# Patient Record
Sex: MALE | Race: WHITE
[De-identification: names, ages, dates, MRNs, and addresses within clinical notes are randomized per-mention and may not be internally consistent; named-entity substitution may affect disease eponyms.]

---

## 2018-09-24 ENCOUNTER — HOSPITAL ENCOUNTER (INPATIENT)
Dept: HOSPITAL 80 - FED | Age: 43
LOS: 2 days | Discharge: STILL ADMITTED OR ADMINISTRATIVE | DRG: 750 | End: 2018-09-26
Attending: PSYCHIATRY & NEUROLOGY | Admitting: PSYCHIATRY & NEUROLOGY
Payer: MEDICAID

## 2018-09-24 DIAGNOSIS — F25.0: Primary | ICD-10-CM

## 2018-09-24 DIAGNOSIS — B19.20: ICD-10-CM

## 2018-09-24 DIAGNOSIS — R22.1: ICD-10-CM

## 2018-09-24 DIAGNOSIS — F12.90: ICD-10-CM

## 2018-09-24 DIAGNOSIS — F17.200: ICD-10-CM

## 2018-09-24 DIAGNOSIS — F15.90: ICD-10-CM

## 2018-09-24 DIAGNOSIS — Z59.0: ICD-10-CM

## 2018-09-24 DIAGNOSIS — I10: ICD-10-CM

## 2018-09-24 LAB — PLATELET # BLD: 309 10^3/UL (ref 150–400)

## 2018-09-24 PROCEDURE — G0480 DRUG TEST DEF 1-7 CLASSES: HCPCS

## 2018-09-24 SDOH — ECONOMIC STABILITY - HOUSING INSECURITY: HOMELESSNESS: Z59.0
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
## 2018-09-25 RX ADMIN — NICOTINE POLACRILEX PRN MG: 2 GUM, CHEWING BUCCAL at 12:56
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
## 2018-09-25 NOTE — ASMTTCLDSP
TLC Discharge Disposition

 

Disposition:                  Answers:  Admit                                 

Disposition Notes:            

Notes:

Admit 3[facility].

Discharge                     

Concerns/Recommendations:     

Notes:

In consultation with [facility] ED physician, Gurdeep Trujillo MD and on-call psychiatrist, Shola Villegas MD, both concurred that pt appears to meet 27-65 criteria requiring psychiatric 

hospitalization as pt appears to be at risk of harm to self/gravely disabled due to a mental 

illness condition. Pt was given the 3N prohibited belongings list while in the ED.

Was patient given the         Answers:  Yes                                   

Inpatient Behavioral                                                          

Health Prohibited                                                             

Belongings List while in                                                      

the ED?                                                                       

For inpatient                 Shola Villegas MD

admission, the following      

psychiatrist agreed to        

accept patient for            

admission to Behavioral       

Health ([facility]):              

Type of Hold:                 Answers:  M1/72-hour Hold                       

Hold initiated by:            Answers:  Other                         Notes:  [facility] Clinician

 

Date Signed:  09/25/2018 09:48 AM

Electronically Signed By:Derick Baker
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
## 2018-09-26 ENCOUNTER — HOSPITAL ENCOUNTER (INPATIENT)
Dept: HOSPITAL 80 - FED | Age: 43
LOS: 2 days | Discharge: PSYCHIATRIC OR SUBSTANCE USE FACILITY | DRG: 651 | End: 2018-09-28
Attending: INTERNAL MEDICINE | Admitting: INTERNAL MEDICINE
Payer: MEDICAID

## 2018-09-26 VITALS — DIASTOLIC BLOOD PRESSURE: 64 MMHG | SYSTOLIC BLOOD PRESSURE: 127 MMHG

## 2018-09-26 DIAGNOSIS — Z59.0: ICD-10-CM

## 2018-09-26 DIAGNOSIS — B19.20: ICD-10-CM

## 2018-09-26 DIAGNOSIS — D36.0: Primary | ICD-10-CM

## 2018-09-26 DIAGNOSIS — F25.0: ICD-10-CM

## 2018-09-26 DIAGNOSIS — Z23: ICD-10-CM

## 2018-09-26 DIAGNOSIS — Z72.0: ICD-10-CM

## 2018-09-26 LAB — PLATELET # BLD: 300 10^3/UL (ref 150–400)

## 2018-09-26 PROCEDURE — G0008 ADMIN INFLUENZA VIRUS VAC: HCPCS

## 2018-09-26 PROCEDURE — G0009 ADMIN PNEUMOCOCCAL VACCINE: HCPCS

## 2018-09-26 RX ADMIN — ACETAMINOPHEN PRN MG: 325 TABLET ORAL at 15:25

## 2018-09-26 RX ADMIN — DIVALPROEX SODIUM SCH MG: 500 TABLET, EXTENDED RELEASE ORAL at 21:17

## 2018-09-26 RX ADMIN — NICOTINE POLACRILEX PRN MG: 2 GUM, CHEWING BUCCAL at 02:19

## 2018-09-26 SDOH — ECONOMIC STABILITY - HOUSING INSECURITY: HOMELESSNESS: Z59.0

## 2018-09-26 NOTE — ASMTBHMTP
Master Treatment Plan

 

Master Treatment Plan         Answers:  Depressed Mood with                   

for:                                    Suicidal Ideation                     

Date:                         09/26/2018

Diagnosis on Admission:       Schizoaffective Disorder, Bipolar Type   295.70 

                               (F25.0)        

Expected length of stay:      3-5 days

Reason for admission:         

Notes:

The patient stated, "Suicidal; ending my life... cutting." He endorsed current SI rating himself 

10/10. He denied HI rating himself 0/10. The patient reported auditory hallucinations, stating "I'm 


hearing voices; they tell me all kinds of things... End your life...There is poison in the 

food;" he rated AH 8/10.  The patient endorsed feeling anxiety "a little" ad depression rating 

himself a 10/10. The patient reported that he has been off medication for the past few weeks; he 

stated that he recalls being prescribed Clozaril, unknown dosage. He can't remember the other names 


of his medications and reported having been on many different psychiatric medications throughout 

his life. 

Patient's stated              

presenting problems:          

Notes:

The patient is refusing to eat; he has not eaten since he arrived on the unit. He reported being 

discourage by . He stated, "All kinds of life issues" including a "sore lymphnode" which is 

causing the patient pain when he swallows. The nurse and NP have been notified and a consult 

requested. 

The patient reported having lived a the homeless shelter prior to admission, he stated, "I don't 

want to go back to the shelter at least not immediately because everyone is talking about me and 

plotting against me. There are too many people at the shelter and I'm having a hard time; I can't 

think. I want to get this out of my system otherwise it will be the same old thing."

Patient's goals for           

treatment:                    

Notes:

The patient stated, "Hopefully, I'll get medications that will work and make me feel better and 

then I can go to respite after this." 

Patient's strengths:          

Notes:

The patient stated, "I don't think I have strengths." CC discussed with patient their motivation 

and willingness for treatment.  

Identify supports outside     

of hospital:                  

Notes:

The patient stated, "No; I don't have any family."

Discharge criteria:           

Notes:

Suicidal ideation will resolve and patient will have plan to safely manage recurrent suicidal 

ideation. 

Initial disposition           

plan/considerations:          

Notes:

The patient stated, "I would like to go to respite for a few weeks to try and find a place to rent 

with my social security." 

Master Treatment Plan Required Signatures

 

Psychiatrist signature:       Answers:  Psychiatrist: ______________________________

RN on-shift signature:        Answers:  RN: ____________________________________

Patient signature:            Answers:  Patient: ____________________________________

 

Date Signed:  09/26/2018 09:11 AM

Electronically Signed By:Amrita Haas

## 2018-09-26 NOTE — EDPHY
H & P


Time Seen by Provider: 09/26/18 12:18


HPI/ROS: 





CHIEF COMPLAINT:  Left neck mass x1 week





HISTORY OF PRESENT ILLNESS:  43-year-old male currently on an M1 hold, residing 

at [address] [place] [address], sent to the emergency 

department for evaluation of 1 week of left submandibular mass described as 

tender, nonfluctuant, nondraining.  Positive mild sore throat.  Positive poor 

dentition history.  Denies history of trauma, denies strangulation, denies 

injection in this area.  Denies headache.  Denies nausea or vomiting.  Denies 

change in voice.





PRIMARY CARE PROVIDER:  





REVIEW OF SYSTEMS:


10 systems reviewed and negative with the exception of the elements mentioned 

in the history of present illness








PAST MEDICAL & SURGICAL  HISTORY:  Schizoaffective disorder and bipolar mood 

disorder currently on an M1 hold admitted at [address]





SOCIAL HISTORY:History of cannabis use  .  Remote history of IV drug use.  No 

known history of HIV.   














************


PHYSICAL EXAM





(Prior to examination, patient consented to physical exam, hands were washed 

and my usual and customary physical exam procedures followed)


1) GENERAL: Well-developed, well-nourished, alert and oriented.  Appears to be 

in no acute distress.


2) HEAD: Normocephalic, atraumatic


3) HEENT: Pupils equal, round, reactive to light bilaterally.  Sclera 

anicteric.  Nasopharynx, oropharynx, clear, no lesions. Moist Mucous membranes.

  Poor dentition.  No tonsillar enlargement or exudate.  No trismus no 

drooling.  No sub lingual tenderness.  No submental tenderness.  Approximately 

4 cm x 2 cm left submandibular masses noted.  This is tender.  It is non 

fluctuant.  There are no overlying skin changes to suggest cutaneous abscess or 

cellulitis.  Ears bilaterally with normal tympanic membranes.


4) NECK: Full range of motion, no meningeal signs.


5) LUNGS: Clear auscultation bilaterally, no wheezes, no rhonchi, no 

retractions.   


6) HEART: Regular rate and rhythm, no murmur, no heave, no gallop.


7) ABDOMEN: No guarding, no rebound, no focal tenderness, negative McBurney's, 

negative Harper's, negative Rovsing's, negative peritoneal sign,


8) MUSCULOSKELETAL: Moving all extremities, no focal areas of tenderness, no 

obvious trauma.  No peripheral edema or discoloration.


9) BACK: No CVA tenderness, no midline vertebral tenderness, no fluctuance, no 

step-off, no obvious trauma, no visual or palpable abnormality. 


10) SKIN: No rash, no petechiae. 


11) Psychiatric:  Patient is oriented X 3, there is no agitation.








***************





DIFFERENTIAL DIAGNOSIS:  In no particular order including but not limited to 

adenopathy, Ludwigs Angina, abscess, peritonsillar abscess , malignancy








- Medical/Surgical History


Hx Asthma: No


Hx Chronic Respiratory Disease: No


Hx Diabetes: No


Hx Cardiac Disease: No


Hx Renal Disease: No


Hx Cirrhosis: No


Hx Alcoholism: No


Hx HIV/AIDS: No


Hx Splenectomy or Spleen Trauma: No


Other PMH: hep c, hypertension, iv drug addiction, gerd, depression





- Social History


Smoking Status: Heavy smoker


Constitutional: 


 Initial Vital Signs











Temperature (C)  36.8 C   09/26/18 12:23


 


Heart Rate  101 H  09/26/18 12:23


 


Respiratory Rate  18   09/26/18 12:23


 


Blood Pressure  141/107 H  09/26/18 12:23


 


O2 Sat (%)  96   09/26/18 12:23








 











O2 Delivery Mode               Room Air














Allergies/Adverse Reactions: 


 





No Known Allergies Allergy (Verified 09/24/18 13:24)


 








Home Medications: 














 Medication  Instructions  Recorded


 


Sertraline HCl [Zoloft 50mg (*)] 50 mg PO DAILY 09/25/18


 


risperiDONE [Risperidone] 3 mg PO HS 09/25/18














Medical Decision Making





- Diagnostics


Imaging Results: 


 Imaging Impressions





Chest X-Ray  09/26/18 13:54


Impression: Prominence of perihilar interstitial markings and peribronchial 

cuffing. Findings are nonspecific but can be seen with bronchitis, reactive 

airway disease, or viral process. 











ED Course/Re-evaluation: 





1224 pm:  Patient has a left submandibular mass.  Will obtain CT angiography of 

the neck as well as i-STAT creatinine.  I saw this patient independently based 

on established practice protocols.  Patient's airway is patent.  No evidence of 

impending airway compromise.  Care of patient under supervision of  secondary 

supervising physician Dr Dewayne Jimenez with whom I discussed case.





1:45 p.m.:  Consultation with staff radiologist regarding the patient's CT of 

the neck.  He is noted to have multiple lesions concerning for possible 

malignancy.  Plan will be admission, consultation with ENT Oncology.  He 

remains on an M1 hold.





1:53 p.m.:  Phone consultation with Dr. Rubio, oncology who agrees to consult 

the patient, requests that hospitalist contact him after evaluating the patient 

and he will plan on seeing the patient tomorrow.





2:00 p.m.:  Consultation with hospitalist Nola, admit to Dr. Joseph Crowder.  

Patient still on an M1 hold , will need to go to the intensive care unit.





2:03 p.m.: consultation with otolaryngology WILL Grant will consult for ENT





- Data Points


Laboratory Results: 


 Laboratory Results





 09/26/18 12:35 





 09/26/18 12:35 





 











  09/26/18 09/26/18 09/26/18





  12:44 12:35 12:35


 


WBC      6.62 10^3/uL 10^3/uL





     (3.80-9.50) 


 


RBC      5.27 10^6/uL 10^6/uL





     (4.40-6.38) 


 


Hgb      16.0 g/dL g/dL





     (13.7-17.5) 


 


POC Hgb  16.3 gm/dL gm/dL    





   (13.7-17.5)   


 


Hct      46.3 % %





     (40.0-51.0) 


 


POC Hct  48 % %    





   (40-51)   


 


MCV      87.9 fL fL





     (81.5-99.8) 


 


MCH      30.4 pg pg





     (27.9-34.1) 


 


MCHC      34.6 g/dL g/dL





     (32.4-36.7) 


 


RDW      12.4 % %





     (11.5-15.2) 


 


Plt Count      300 10^3/uL 10^3/uL





     (150-400) 


 


MPV      9.9 fL fL





     (8.7-11.7) 


 


Neut % (Auto)      67.3 % %





     (39.3-74.2) 


 


Lymph % (Auto)      21.6 % %





     (15.0-45.0) 


 


Mono % (Auto)      10.4 % %





     (4.5-13.0) 


 


Eos % (Auto)      0.0 % L %





     (0.6-7.6) 


 


Baso % (Auto)      0.5 % %





     (0.3-1.7) 


 


Nucleat RBC Rel Count      0.0 % %





     (0.0-0.2) 


 


Absolute Neuts (auto)      4.46 10^3/uL 10^3/uL





     (1.70-6.50) 


 


Absolute Lymphs (auto)      1.43 10^3/uL 10^3/uL





     (1.00-3.00) 


 


Absolute Monos (auto)      0.69 10^3/uL 10^3/uL





     (0.30-0.80) 


 


Absolute Eos (auto)      0.00 10^3/uL L 10^3/uL





     (0.03-0.40) 


 


Absolute Basos (auto)      0.03 10^3/uL 10^3/uL





     (0.02-0.10) 


 


Absolute Nucleated RBC      0.00 10^3/uL 10^3/uL





     (0-0.01) 


 


Immature Gran %      0.2 % %





     (0.0-1.1) 


 


Immature Gran #      0.01 10^3/uL 10^3/uL





     (0.00-0.10) 


 


POC Sodium  141 mEq/L mEq/L    





   (135-145)   


 


Sodium    140 mEq/L mEq/L  





    (135-145)  


 


POC Potassium  4.2 mEq/L mEq/L    





   (3.3-5.0)   


 


Potassium    4.5 mEq/L mEq/L  





    (3.3-5.0)  


 


POC Chloride  102 mEq/L mEq/L    





   ()   


 


Chloride    101 mEq/L mEq/L  





    ()  


 


Carbon Dioxide    29 mEq/l mEq/l  





    (22-31)  


 


Anion Gap    10 mEq/L mEq/L  





    (8-16)  


 


POC BUN  12 mg/dL mg/dL    





   (7-23)   


 


BUN    14 mg/dL mg/dL  





    (7-23)  


 


Creatinine    1.0 mg/dL mg/dL  





    (0.7-1.3)  


 


POC Creatinine  1.0 mg/dL mg/dL    





   (0.7-1.3)   


 


Estimated GFR    > 60   





    


 


Glucose    112 mg/dL H mg/dL  





    ()  


 


POC Glucose  108 mg/dL H mg/dL    





   ()   


 


Calcium    9.9 mg/dL mg/dL  





    (8.5-10.4)  











Medications Given: 


 





Acetaminophen (Tylenol)  650 mg PO Q4HRS PRN


   PRN Reason: Pain, Mild/Fever, Can Take PO


   Stop: 03/25/19 14:38


   Last Admin: 09/26/18 15:25 Dose:  650 mg





Point of Care Test Results: 


 Chemistry











  09/26/18





  12:44


 


POC Sodium  141 mEq/L mEq/L





   (135-145) 


 


POC Potassium  4.2 mEq/L mEq/L





   (3.3-5.0) 


 


POC Chloride  102 mEq/L mEq/L





   () 


 


POC BUN  12 mg/dL mg/dL





   (7-23) 


 


POC Creatinine  1.0 mg/dL mg/dL





   (0.7-1.3) 


 


POC Glucose  108 mg/dL H mg/dL





   () 








 ISTAT H&H











  09/26/18





  12:44


 


POC Hgb  16.3 gm/dL gm/dL





   (13.7-17.5) 


 


POC Hct  48 % %





   (40-51) 














Departure





- Departure


Disposition: [facility] Inpatient Acute


Clinical Impression: 


 Schizoaffective disorder, bipolar type, Neck malignant neoplasm





Condition: Fair

## 2018-09-26 NOTE — BDS
REASON FOR ADMISSION:  From the ED note dated 09/24/2018, the patient presented 
to the ED on an M1 Psychiatric Hold from [facility].  The patient 
has been off his medications for the treatment of schizoaffective disorder.  
The patient reported command hallucinations to kill himself.  The patient 
denied drug and alcohol use.  The patient was admitted involuntarily on an M1 
Hold due to being gravely disabled due to a mental illness.  The patient was 
admitted for safety, crisis stabilization, and medication evaluation.



ADMITTING DIAGNOSES:  

1.  Schizoaffective disorder, bipolar type. 

2.  Stimulant use disorder, severe. 

3.  Cannabis use disorder, severe. 

4.  Homelessness.



ADMISSION PHYSICAL EXAM:  The patient was seen by Dr. Joshi on 09/25/2018, for 
an Internal Medicine consultation for medical clearance for inpatient 
Behavioral Health stay.  Dr. Joshi reported he saw no medical 
contraindications to the patient's continued stay in the Inpatient Behavioral 
Health unit or to any psychiatric medications and procedures.  For further 
details, please refer to Dr. Joshi's Internal Medicine consultation note dated 
09/25/2018.



ADMISSION LABS:  CBC from 09/24/2018, within normal limits.  Chemistry from 09/
24/2018, within normal limits.  Fasting lipid panel from 09/24/2018, within 
normal limits.  Hemoglobin A1c from 09/24/2018, was 5.7, within normal limits.  
Fasting lipid panel from 09/24/2018, within normal limits except LDL 
cholesterol calculated was elevated at 104.  TSH from 09/24/2018, was 1.510.  
Toxicology screen from 09/24/2018, was negative for all substances of abuse, 
and negative for ethyl alcohol.



MAJOR PROCEDURES OR TESTS:  None.



HOSPITAL COURSE:  The most prominent symptoms and behaviors while the patient 
was here were patient reported auditory command hallucinations and paranoid 
delusions.  The patient reported that he would not eat because he thought the 
food was poisoned.  The patient was withdrawn to his room.  Treatment 
modalities utilized were milieu and group therapy.  Invega 6 mg p.o. daily was 
started to target psychosis.  It was tolerated with no report of side effects.  
Depakote ER 1500 mg p.o. at bedtime was started to target mood symptoms and was 
tolerated with no report of side effects.  During the course of the hospital 
stay, the patient continued to not eat.  On a further evaluation, the patient 
reported that he had discomfort when swallowing due to a neck mass.  The 
patient reported that this mass had started about a week ago, and has been 
rapidly getting larger and making it more difficult and uncomfortable for him 
to swallow.  Patient was referred to [facility] and 
referred for a surgical consultation for the neck mass that was firm and 
tender.  The patient is a heavy smoker, and is at risk for head and neck cancer
, as well as lung cancer.  The patient was referred to Surgery for biopsy.  The 
patient had no signs and symptoms of head or neck infection.  The patient was 
discharged from [address] and was transferred to [facility] for the surgical consult by TEQUILA.



CONDITION AT DISCHARGE:  At time of discharge, the patient was continuing to 
complain of auditory hallucinations and paranoid thoughts regarding his food 
being poisoned.  The patient was withdrawn to his room.  At time of transfer, 
the patient's level of risk was low.



MENTAL STATUS EXAM:  The patient is an undernourished male, looking older than 
chronological age.  Attire is appropriate.  Dress is hospital garb.  Grooming 
status is inappropriate and disheveled.  Ambulation is independent.  Gait is 
normal and coordinated.  Posture is abnormal and slumped.  Eye contact is 
inappropriate and avoided.  Motor activity is underactive however, movements 
are purposeful, organized, coordinated with no involuntary movements noted.  
Attitude is uncooperative and guarded.  The patient appears disinterested and 
distractible, and does not relate well to this interviewer.  Language 
production is unspontaneous, rate is hesitant.  Latency of response is 
prolonged with sad tone and low volume.  Amount is sparse.  Articulation is 
fairly clear.  Patient reports mood as okay with constricted, flat, 
inappropriate and incongruent affect.  The patient's thought process is 
nonlinear and illogical.  The patient does not report suicidal or homicidal 
thoughts, ideas, or plans.  The patient does report auditory command 
hallucinations.  The patient denies visual hallucinations.  Patient reports 
delusions, paranoid in type, specifically regarding his food being poisoned.  
The patient does not appear to be attending to internal stimuli.  The patient 
is oriented to person, place, time, and situation.  The patient's attention and 
concentration are poor.  The patient's insight and judgment are poor.  There is 
no evidence of gross cognitive dysfunction at any point during the interview 
and no evidence of apparent dysfunction in recent or remote memory noted.  The 
patient does not report undesirable side effects from current medications.



DISCHARGE DIAGNOSIS:  

1.  Schizoaffective disorder, bipolar type. 

2.  Stimulant use disorder, severe.  

3.  Cannabis use disorder, severe. 

4.  Homelessness. 

5.  Rule out malingering.



CURRENT MEDICATIONS:  Patient to continue Invega 6 mg p.o. daily and Depakote 
1500 mg p.o. at bedtime.



DISPOSITION:  The patient left hospital and will be transferred to [facility] for a surgical consult for a neck mass.  The patient 
did leave independently and voluntarily.



FOLLOWUP:  Care coordinator reports the appropriate outpatient follow-up 
services have been established and outpatient appointments have been scheduled.
  The patient received written instructions with times and dates of outpatient 
follow-up appointments.  The following follow-up recommendations were provided 
to the patient at discharge: Continue psychotropic medications as prescribed 
and attend appointments as scheduled.  Report any side effects to a psychiatric 
outpatient provider, a primary care provider, or other health care 
professional.  Address any questions or problems concerning the psychotropic 
medications with a psychiatric outpatient provider, a primary care provider, or 
other health care professional.  Contact [facility] or [facility], or go 
to the nearest emergency room, if you are ever a danger to yourself/others, or 
unable to care for yourself.  As soon as possible, establish a routine 
medication management treatment with a psychiatric provider, establish routine 
therapy appointments, and follow-up with a primary care provider.  



LEGAL COURSE:  The patient was admitted on an M1 Hold.  The patient was 
transferred to [facility] for a surgical consult for 
neck mass.



ATTITUDE AT TIME OF DISCHARGE:  The patients attitude was positive at time of 
discharge, and patient reports looking forward to discharging today.  The 
patient reports he feels safe to discharge, is no longer a danger to himself or 
others, is in stable condition, and contracts for safety.  Patient states he 
will continue medications as prescribed, and establish medication management 
treatment with an outpatient provider after discharge.  Patient reports he 
understands the information that has been provided to him, and he understands, 
accepts, and agrees to psychotropic medications.  Patient describes internal 
protective factors as the coping skills he has learned while hospitalized here, 
and he plans to continue to practice these coping skills after discharge.  



LABS AND STUDIES:  There were no pending labs or studies at time of discharge.



ADVANCED DIRECTIVES:  There were no advance directives on file, and patient was 
full code during this hospitalization.





Job #:  [id number]

MTDD
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
## 2018-09-26 NOTE — SOAPPROG
SOAP Progress Note


Assessment/Plan: 


Assessment:





Schizoaffective disorder, bipolar type, complicated by substance use and non-

adherence to treatment.  Stimulant use disorder, severe.  Cannabis use disorder

, severe.  Non-adherence to medical treatment.  Homelessness.  No improvement 

noted. (see subjective/objective note).  Patient is not safe to discharge at 

this time as patient continues to exhibit signs of psychosis, and express 

psychosis symptoms.  Patient requires continued inpatient care because of 

current psychosis, and requires inpatient level of care to stabilize in order 

to no longer be a danger to himself and gravely disabled due to mental illness.

  Patient currently expressing command auditory hallucinations to not eat and 

to kill himself.  Patient could benefit from continued inpatient 

hospitalization for crisis stabilization, safety, and medication evaluation.  





Plan:





(1) Psychotropic medications: After reviewing options, risks, and benefits 

patient agrees to continue current medications.  No medication changes at this 

time as more time is needed to determine ongoing tolerability and efficacy.  

Plan is to continue to observe patient for response and side effects from 

medications, and ongoing monitoring and evaluation.  


(2) Review with patient informed consent and recommendations for psychotropic 

medication treatment listed below


(3) Labs: no additional labs at this time


(4) Therapy: continue milieu and group therapy  


(5) Further investigation including gathering information from patients 

relatives and review of past case records to inform treatment plan.


(6) Safety/Wellness plan and follow-up outpatient appointments to be 

established prior to discharge.  Next steps are for patient to meet with care 

manager to plan a safe discharge plan and establish outpatient services for 

ongoing treatment.  


(7) Confer with inpatient treatment team regarding treatment plan.  


(8) Legal status: M1, to sign in voluntary when M1 expires


(9) Consider discharge next week if patient is in stable condition, safe, and 

has a safe discharge plan.   


(10) Substance abuse interventions: methamphetamine and cannabis 


(11) Nursing care orders to encourage fluids and nourishment





PSYCHOTROPIC MEDICATION TREATMENT INFORMED CONSENT and RECOMMENDATIONS: Review 

nature of condition, diagnosis, and prognosis.  Review nature and purpose of 

psychotropic medication treatment.  Review type of psychotropic medications 

being ordered.  Review risk and benefits of psychotropic medication treatment.  

Review probable length of time patient will need to take medications.  Review 

risk and benefits of not undergoing psychotropic medication treatment.  Review 

alternative treatments to psychotropic medications.  Review psychotropic 

medications contraindications, drug-drug interactions, side effects, and 

importance of reporting any side effects to a psychiatric provider or nurse 

during inpatient hospitalization, and upon discharge to patients psychiatric 

outpatient provider, primary care provider, or other health care professional.  

Review importance of asking a nurse, psychiatric provider, or primary care 

provider any questions or problems concerning the psychotropic medications.  

Verify patient understands the information that has been provided, and 

understands, accepts, and agrees to psychotropic medications.  





Review patients safety plan and importance of patient to report to staff while 

hospitalized if patient is ever a danger to self/others, or unable to care for 

self, and upon discharge, the importance for patient to contact [facility] or [facility], or go to the nearest emergency room, if patient is ever a 

danger to self/others, or unable to care for self.  Recommend that upon 

discharge patient establish medication management treatment with a psychiatric 

provider, establishes routine therapy appointments, and follow-up with primary 

care provider.  Verify patient understands and agrees to these recommendations.





09/26/18 08:28





Subjective: 


Following up with patient for evaluation of psychosis and safety.  Patient 

reports, "I am okay, I do not want to eat breakfast because the food is 

poisoned."  Patient reports command auditory hallucinations telling him not to 

eat because food is poisoned.  Patient states command hallucinations telling 

him to "just end it, get it over with, and kill yourself."  Patient expresses 

the following psychiatric symptoms auditory hallucinations.  Patient reports 

taking medications as prescribed, and describes response to medications as 

poor.  Patient does not report undesirable side effects from the medications, 

and agrees to continue current medications.  Patient reports appetite as poor, 

and reports he has not eaten since his admission yesterday afternoon.  Patient 

describes getting 8 hours of sleep.





Objective: 





 Vital Signs











Temp Pulse Resp BP Pulse Ox


 


 36.8 C   101 H  15   127/64 H  94 


 


 09/26/18 06:00  09/26/18 06:00  09/26/18 06:00  09/26/18 06:00  09/26/18 06:00








NURSING REPORT: Consulted with nursing for update on patients progress in 

treatment.  Nurses report patient is not engaged in treatment, is withdrawn to 

room, is not attending groups, slept 10 hours, expresses the following 

psychiatric symptoms: auditory hallucinations, paranoia; exhibits the following 

psychiatric symptoms: paranoid, withdrawn; is not eating any meals (patient has 

not eaten any meals since arriving on the unit), is agreeable to medications 

and taking as prescribed with no report of side effects, with no s/s of EPS/

akathisia, and denies SI/HI, denies visual hallucinations, and denies 

delusions.  Reports auditory hallucinations to not eat because food is 

poisoned. 





CASE COORDINATOR UPDATE: currently setting up OP appointments.





MSE: The patient is an under-nourished male looking older than chronological 

age.  Attire is inappropriate and dress is hospital garb, and is disheveled.  

Grooming status is inappropriate and disheveled.  Ambulation is independent.  

Gait is normal and coordinated.  Posture is normal and relaxed.  Eye contact is 

appropriate, and adequate.  Motor activity is appropriate with purposeful, 

organized, coordinated movements; with no involuntary movements.  Attitude is 

uncooperative and guarded.  Patient appears disinterested and does not relate 

well to this interviewer.  Language production is unspontaneous.  Rate is 

hesitant.  Latency of response is prolonged, with sad tone, and low volume, and 

amount is sparse.  Articulation is clear.  Patient reports mood as okay with 

constricted, blunted, flat, incongruent and inappropriate affect.  Patients 

thought process is non-linear, disorganized, illogical.  Associations are 

loose.  Patient does report suicidal thoughts (command hallucinations).  

Patient reports auditory, denies visual hallucinations.  Patient reports 

paranoid delusions regarding food being poisoned.  Patient does not appear to 

be attending to internal stimuli.  Patients attention and concentration are 

poor.  Patient is oriented to person, place, time, and situation.  Patients 

insight is poor.  Patients judgment is poor.  No evidence of gross cognitive 

dysfunction at any point during the interview.  No evidence of apparent 

dysfunction in recent or remote memory.





SUBSTANCE ABUSE BRIEF INTERVENTION: Brief intervention regarding the risks of 

methamphetamine and cannabis abuse is provided to patient with goal to reduce 

the risk of harm that could result from the continued use of methamphetamine 

and cannabis, with the general aim to investigate the problem, raise awareness 

of problem, develop a solution with the patient, recommend a specific change or 

activity, and motivate the patient toward change.  Assess substance abuse 

behavior and give supportive advice about harm reduction, recommend a reduction 

in hazardous/at-risk consumption patterns, and facilitate referrals for 

additional specialized treatment with care coordinator.  Intermediate goal is 

for the patient to quit use and attend OP substance abuse treatment.  

Intervention focus on intermediate goals to allow for more immediate success in 

the treatment process to keep the patient motivated.  Review following with 

patient: Cannabis use risks: Short-term use: impaired short-term memory, 

impaired motor coordination, altered judgement, in high doses paranoia and 

psychosis.  Long-term use addiction, diminished life satisfaction and 

achievement, symptoms of chronic bronchitis, and increased risk of chronic 

psychosis disorders if predisposition to such disorders.  In withdrawal anger, 

aggression irritability, anxiety and nervousness, decreased appetite or weight 

loss, restlessness, and sleep difficulties with strange dreams.  

Methamphetamine use risks: Short-term: insomnia, irritability, aggressive 

behavior, hallucinations, delusions, intellectual deficits, anxiety, depression

, convulsions, damage to blood vessels in the brain causing strokes, high fevers

, collapse of the circulatory system. Long-term: damage to nerve pathways, 

maybe irreversibly; overstimulation to dopamine impairing dopamine transport 

and reducing efficiency of dopamine receptors, the reward system becomes worn 

out, leading to inability to experience pleasure for years.





- Time Spent With Patient


Time Spent With Patient: 


15 minutes, met with patient individually.








- Pending Discharge


Pending Discharge Within 24 Hours: No


Pending Discharge Within 48 Hours: No





ICD10 Worksheet


Patient Problems: 


 Problems











Problem Status Onset


 


Acute psychosis Acute  


 


Cannabis use disorder, severe, dependence Acute  


 


Homelessness Acute  


 


Stimulant use disorder Acute  


 


Schizoaffective disorder, bipolar type Chronic

## 2018-09-26 NOTE — GCON
ENT CONSULTATION



DATE OF CONSULTATION:  09/26/2018



REFERRING PHYSICIAN:  Naresh Crowder MD





REASON FOR CONSULTATION:  Left neck mass.



HISTORY:  The patient is a 43-year-old man, who states that approximately 1 week ago he began noticin
g a sore throat on the left side and a lump on the left side of the neck.  It hurts every time he swa
llows.  He denies any ear pain.  He denies any shortness of breath.  He does state that he has lost s
ome weight, but he has no idea how much as he has not weighed himself.  He just states that he feels 
like he is getting skinnier.  The patient has a significant history of smoking, smoking 1 pack of cig
arettes a day for many years.  He was admitted to the hospital and placed in the intensive care unit 
on observation due to concerns of suicidal ideation.



PAST MEDICAL HISTORY:  History of schizoaffective disorder and bipolar disorder.  He was admitted on 
09/25 to the psychiatric inpatient campus at [place] and started on antipsychotic medications.  Acco
rding to the admission report, he told the admitting doctor he had lost 25 pounds over the last month
, which he had attributed to lack of access to food.  He has had some night sweats, but no fevers.  H
e mentioned a 20-pack-year smoking history.



PHYSICAL EXAM:  GENERAL:  The patient is a thin man, awake, alert, and very pleasant throughout the e
valuation.  EAR EXAM:  Normal bilaterally.  NASAL EXAM:  Anteriorly clear.  ORAL CAVITY EXAM:  Notabl
e for advanced dental decay.  Tongue and floor of mouth are normal with no swelling or abnormalities.
  PHARYNGEAL EXAM:  Shows some watery edema of the soft tissues around the left tonsil and slight asy
mmetry of the left tonsil versus right.  Normal sensation of the palate, tonsils, and tongue.  Normal
 tongue mobility.  No ulcerations are seen.  NECK EXAM:  The patient has a 2 x 2 cm round hard mass i
n level 3 of the left side of the neck.  It is mobile.  No other adenopathy is palpable. 



Flexible endoscopy was then performed of the patient's nose.  The flexible endoscope was passed throu
gh both nostrils.  There were no signs of nasal lesions, infection, or polyps.  Nasopharyngeal exam w
as normal bilaterally, as were the fossae of Rosenmuller.  Hypopharynx and larynx exam was notable fo
r fullness of the left tonsil and left hypopharyngeal wall, but again no ulceration was seen.  The ep
iglottis and vallecula were normal.  Base of tongue was slightly full on the left edge, but otherwise
 normal.  Laryngeal exam showed healthy-appearing vocal cords, which moved well on phonation and resp
iration.



IMAGING:  CT scan images were examined by me.  These showed evidence of a mass in the left tonsil, as
 well as the enlarged lymph node.  The report stated multiple enlarged lymph nodes, with the largest 
being 16 x 16 x 27 mm.  It also mentions bilateral tonsillar masses and left aryepiglottic fold being
 suspicious for neoplasm.  However, I felt that the problem was related to the left tonsil and left l
ateral hypopharyngeal wall, as well as the previously mentioned lymph node.



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
 spread to the left neck.  This does not appear to be an infectious process.  At this point, I think 
we need to treat it as if it is a malignancy until proven otherwise.  Given the size of the lymph nod
e, I think it will be beneficial to obtain a core needle biopsy with ultrasound guidance.  This can b
e done as an inpatient with the radiology department.  We will see if we can get this ordered.  Altho
ugh I do not have much experience with this dreaded computer program and system, I will do my best.  
We have already called and left a voice message with the radiology department to try to get this done
 tomorrow.  



Thank you so much for this consultation.





Job #:  [id number]

## 2018-09-27 LAB — HIV TYPE 1 AND 2: NEGATIVE

## 2018-09-27 PROCEDURE — 07B23ZX EXCISION OF LEFT NECK LYMPHATIC, PERCUTANEOUS APPROACH, DIAGNOSTIC: ICD-10-PCS | Performed by: RADIOLOGY

## 2018-09-27 RX ADMIN — NICOTINE POLACRILEX PRN MG: 2 GUM, CHEWING BUCCAL at 21:09

## 2018-09-27 RX ADMIN — PALIPERIDONE SCH MG: 3 TABLET, EXTENDED RELEASE ORAL at 08:04

## 2018-09-27 RX ADMIN — NICOTINE POLACRILEX PRN MG: 2 GUM, CHEWING BUCCAL at 10:48

## 2018-09-27 RX ADMIN — ACETAMINOPHEN PRN MG: 325 TABLET ORAL at 21:04

## 2018-09-27 RX ADMIN — NICOTINE POLACRILEX PRN MG: 2 GUM, CHEWING BUCCAL at 17:06

## 2018-09-27 RX ADMIN — ACETAMINOPHEN PRN MG: 325 TABLET ORAL at 08:02

## 2018-09-27 RX ADMIN — NICOTINE POLACRILEX PRN MG: 2 GUM, CHEWING BUCCAL at 23:56

## 2018-09-27 RX ADMIN — NICOTINE POLACRILEX PRN MG: 2 GUM, CHEWING BUCCAL at 19:20

## 2018-09-27 RX ADMIN — DIVALPROEX SODIUM SCH MG: 500 TABLET, EXTENDED RELEASE ORAL at 21:04

## 2018-09-27 NOTE — PDMN
Medical Necessity


Medical necessity: Change to IP, as of 9/26/18, per MD; los >2 mn for ongoing 

management of L neck mass w/difficulty swallowing, weight loss, vocal changes, 

night sweats, occasional SOB & concern for malignancy; [facility] ideation; requiring close monitoring/further 

workup, ENT consult w/neck biopsy, Oncology consult, med management/

stabilization & therapy; hx schizoaffective/bipolar disorder, homelessness

## 2018-09-27 NOTE — PDCONSULT
Consultant Note: 





Hematology/Oncology Consultation


Reason for consultation:  Suspected cancer





History of present illness:


Jones is a 43-year-old male with a significant psychiatric history including 

schizoaffective disorder and bipolar disorder who I am seeing in consultation 

for suspected cancer.  He originally is from [place] but most recently spent 

time in [place].  Around 4 months ago he moved back to [place].  He has no 

family members around is there fairly dispersed.  He has a longstanding history 

of depression, bipolar disorder and schizoaffective disorder which has led to 

multiple suicidal attempts.  He has history of cutting and self-immolation.  He 

was off of medications and had significant suicidal ideations and 

hallucinations.  He was admitted to the [facility] for inpatient 

psychiatric treatment.  He states that prior to admission for around 1-2 months 

he has had symptoms of neck discomfort.  He also has had symptoms of dysphagia 

for the last 10 days.  Around a week ago he noticed a mass within his left 

neck.  He does have history of night sweats, subjective fevers, and a 25 lb 

weight loss over the last 2 months which she attributes to poor nutrition.  

Since being admitted he continues to have symptoms of auditory hallucinations 

and suicidal ideation.  He was brought over to [facility] 

Emergency room for workup of the left neck mass.  He had a CT of the neck 

completed 09/26/2018 that demonstrated a left tonsillar mass measuring 2.0 x 

1.5 cm.  There is also right tonsil with a complex smaller mass measuring 1.0 x 

0.7 cm.  There were multiple lymph nodes that were abnormal appearing in the 

left neck with 1 left jugulodigastric lymph node measuring 1.6 x 1.6 cm.  He 

subsequently had a CT of the chest abdomen pelvis demonstrated is no evidence 

of disease.  He has since undergone ultrasound-guided biopsy with core needle 

of the left neck.





Past medical history:


Depression


Multiple suicidal attempts


Bipolar disorder


Schizoaffective disorder joint hepatitis C





Past surgical history


Skin grafting secondary to self-immolation


Left arm and wrist surgeries related this self cutting.





Social history


Was previously living in a shelter.  Has a 20 pack-year history of smoking.  

Has occasional alcohol use.  Does have 1. Amphetamine use in the past.  





Review of systems:  12 point review of system was obtained was otherwise 

negative


Medications:  Risperdal, sertraline


Allergies:  No known drug allergies





Physical examination:  New














Temp Pulse Resp BP Pulse Ox


 


 36.9 C   89   23 H  107/65   95 


 


 09/27/18 11:27  09/27/18 11:27  09/27/18 11:27  09/27/18 11:27  09/27/18 11:27








General:  Pleasant male appears in no acute distress, thin


HEENT:  There is an abnormal appearing area within the left tonsil as compared 

to the right tonsil.  Otherwise oropharynx is without any other lesions.  

Extraocular movements are intact.  Pupils equal round reactive to light.


Lymph:  There is a left submandibular lymph node measuring 2cm is status post 

biopsy with bandaging.  


Cardiovascular:  Regular rhythm no murmurs gallops or rubs pulmonary:  Clear to 

auscultation bilaterally


Abdomen:  Soft nontender nondistended bowel sounds are present


Extremities:  No cyanosis clubbing or edema


Skin:  Left upper extremity with scars from previous self cutting.  Skin 

grafting noted from prior self Immolation attempt


Psych:  Answers questions appropriately.  Does endorse auditory hallucinations.

  Does endorse suicidal ideation.  Is alert and oriented.


Neuro:  Alert and oriented x4.  Moving all extremities equally.

















WBC  6.62 10^3/uL (3.80-9.50)   09/26/18  12:35    


 


RBC  5.27 10^6/uL (4.40-6.38)   09/26/18  12:35    


 


Hgb  16.0 g/dL (13.7-17.5)   09/26/18  12:35    


 


POC Hgb  16.3 gm/dL (13.7-17.5)   09/26/18  12:44    


 


Hct  46.3 % (40.0-51.0)   09/26/18  12:35    


 


POC Hct  48 % (40-51)   09/26/18  12:44    


 


MCV  87.9 fL (81.5-99.8)   09/26/18  12:35    


 


MCH  30.4 pg (27.9-34.1)   09/26/18  12:35    


 


MCHC  34.6 g/dL (32.4-36.7)   09/26/18  12:35    


 


RDW  12.4 % (11.5-15.2)   09/26/18  12:35    


 


Plt Count  300 10^3/uL (150-400)   09/26/18  12:35    


 


MPV  9.9 fL (8.7-11.7)   09/26/18  12:35    


 


Neut % (Auto)  67.3 % (39.3-74.2)   09/26/18  12:35    


 


Lymph % (Auto)  21.6 % (15.0-45.0)   09/26/18  12:35    


 


Mono % (Auto)  10.4 % (4.5-13.0)   09/26/18  12:35    


 


Eos % (Auto)  0.0 % (0.6-7.6)  L  09/26/18  12:35    


 


Baso % (Auto)  0.5 % (0.3-1.7)   09/26/18  12:35    


 


Nucleat RBC Rel Count  0.0 % (0.0-0.2)   09/26/18  12:35    


 


Absolute Neuts (auto)  4.46 10^3/uL (1.70-6.50)   09/26/18  12:35    


 


Absolute Lymphs (auto)  1.43 10^3/uL (1.00-3.00)   09/26/18  12:35    


 


Absolute Monos (auto)  0.69 10^3/uL (0.30-0.80)   09/26/18  12:35    


 


Absolute Eos (auto)  0.00 10^3/uL (0.03-0.40)  L  09/26/18  12:35    


 


Absolute Basos (auto)  0.03 10^3/uL (0.02-0.10)   09/26/18  12:35    


 


Absolute Nucleated RBC  0.00 10^3/uL (0-0.01)   09/26/18  12:35    


 


Immature Gran %  0.2 % (0.0-1.1)   09/26/18  12:35    


 


Immature Gran #  0.01 10^3/uL (0.00-0.10)   09/26/18  12:35    


 


POC Sodium  141 mEq/L (135-145)   09/26/18  12:44    


 


Sodium  140 mEq/L (135-145)   09/26/18  12:35    


 


POC Potassium  4.2 mEq/L (3.3-5.0)   09/26/18  12:44    


 


Potassium  4.5 mEq/L (3.3-5.0)   09/26/18  12:35    


 


POC Chloride  102 mEq/L ()   09/26/18  12:44    


 


Chloride  101 mEq/L ()   09/26/18  12:35    


 


Carbon Dioxide  29 mEq/l (22-31)   09/26/18  12:35    


 


Anion Gap  10 mEq/L (8-16)   09/26/18  12:35    


 


POC BUN  12 mg/dL (7-23)   09/26/18  12:44    


 


BUN  14 mg/dL (7-23)   09/26/18  12:35    


 


Creatinine  1.0 mg/dL (0.7-1.3)   09/26/18  12:35    


 


POC Creatinine  1.0 mg/dL (0.7-1.3)   09/26/18  12:44    


 


Estimated GFR  > 60   09/26/18  12:35    


 


Glucose  112 mg/dL ()  H  09/26/18  12:35    


 


POC Glucose  108 mg/dL ()  H  09/26/18  12:44    


 


Calcium  9.9 mg/dL (8.5-10.4)   09/26/18  12:35    


 


Urine Opiates Screen  NEGATIVE  (NEGATIVE)   09/26/18  14:03    


 


Urine Barbiturates  NEGATIVE  (NEGATIVE)   09/26/18  14:03    


 


Ur Phencyclidine Scrn  NEGATIVE  (NEGATIVE)   09/26/18  14:03    


 


Ur Amphetamine Screen  NEGATIVE  (NEGATIVE)   09/26/18  14:03    


 


U Benzodiazepines Scrn  NEGATIVE  (NEGATIVE)   09/26/18  14:03    


 


Urine Cocaine Screen  NEGATIVE  (NEGATIVE)   09/26/18  14:03    


 


U Marijuana (THC) Screen  NEGATIVE  (NEGATIVE)   09/26/18  14:03    


 


HIV 1&2 Antibody  NEGATIVE  (NEGATIVE)   09/27/18  11:00    





09/26/2018 CT of the neck with contrast demonstrating a 2.0 x 1.5 cm mass in 

the left tonsil.  There is a right tonsillar complex mass is smaller measuring 

1.0 x 0.7 cm.  Left aryepiglottic fold mass 1.2 by 1.1cm.  There is a left neck 

lymph node measuring 2.7 x 1.6 x 1.6 cm.  There is additional 5 smaller lymph 

nodes up to 1 cm in size within the left neck.





Assessment and plan:





Jones is a very pleasant 43-year-old male with a significant past psychiatric 

history is detailed above who now presents with a left neck mass.





1. Left neck mass:  He does have a tonsillar lesion that is visualized on 

examination.  He also has multiple left neck lymph nodes.  He underwent 

ultrasound-guided biopsy of the left neck node.  Will follow up on pathology.  

My differential currently includes head and neck squamous cell carcinoma of the 

oropharynx with the tonsil possibly being the primary.  An underlying lymphoma 

also remains on the differential given his B symptoms.  Given his underlying 

social situation psychiatric issues I would recommend he stay to finalized 

pathology.  I will follow up with pathology tomorrow to see if I could get a 

preliminary read.





2. Suicide ideation:  He likely will need to go back to a psychiatric facility 

following discharge.  Will follow up with hospitalist team.  This will present 

child is for treatment of his cancer whenever it may be.





3. Schizoaffective disorder:  see #2. 





All questions were answered.  He voiced understanding the plan appears 

appreciative of my care today.

## 2018-09-27 NOTE — HOSPPROG
Hospitalist Progress Note


Assessment/Plan: 





Neck mass - concern for malignancy.  Considered tuberculous lymphadenopathy 

with pt's homeless status and reported h/o fevers and night sweats, though no e/

o prior TB, neg CT chest and afebrile here with nl wbc's.


   -biopsy today


   -check HIV


   -speech / swallow eval, pt tolerated pot roast last night





Schizoaffective / bipolar - He is neither suicidal nor homicidal and was 

discharged from [facility] unit without plans for immediate return, now stable on meds


   -cont Invega, Depakote   


   -D/C the M1 hold





Homelessness - TB skin test





Full code





Dispo - cont inpt.  Will keep overnight to ensure stability after neck biopsy.


Subjective: Pt feels well.  He was able to tolerate pot roast last night.  He 

thinks he had fevers/chills at [facility] unit, but no fevers documented and he 

remains afebrile here.  Denies neck pain.  He has a significant tobacco 

history.  No CP or SOB.


Objective: 


 Vital Signs











Temp Pulse Resp BP Pulse Ox


 


 36.8 C   86   22 H  94/72 L  97 


 


 09/27/18 07:57  09/27/18 07:57  09/27/18 07:57  09/27/18 07:57  09/27/18 07:57








 











 09/26/18 09/27/18 09/28/18





 05:59 05:59 05:59


 


Intake Total  1000 


 


Balance  1000 














- Physical Exam


Constitutional: no apparent distress


Eyes: PERRL


Ears, Nose, Mouth, Throat: moist mucous membranes, other (left neck with firm 

palpable LN, which is not very mobile)


Cardiovascular: regular rate and rhythym, no murmur, rub, or gallop


Respiratory: no respiratory distress, clear to auscultation


Gastrointestinal: normoactive bowel sounds, soft, non-tender abdomen


Skin: warm


Musculoskeletal: full muscle strength


Neurologic: AAOx3


Psychiatric: interacting appropriately





ICD10 Worksheet


Patient Problems: 


 Problems











Problem Status Onset


 


Neck malignant neoplasm Acute  


 


Schizoaffective disorder, bipolar type Chronic  


 


Acute psychosis Acute  


 


Cannabis use disorder, severe, dependence Acute  


 


Homelessness Acute  


 


Stimulant use disorder Acute

## 2018-09-27 NOTE — PDMN
Medical Necessity


Medical necessity: Pt meets IP criteria per NP & MCG B-[id number]; est los >2 mn 

for eval/tx of schizoaffective disorder, bipolar type; pt on M1 hold for 

suicidal ideation; admit for further monitoring/safety, med management & 

stabilization; hx homelessness; per H&P & order 9/25/18

## 2018-09-28 ENCOUNTER — HOSPITAL ENCOUNTER (INPATIENT)
Dept: HOSPITAL 80 - BBEH | Age: 43
LOS: 4 days | Discharge: HOME | DRG: 885 | End: 2018-10-02
Attending: NURSE PRACTITIONER | Admitting: NURSE PRACTITIONER
Payer: MEDICAID

## 2018-09-28 VITALS — DIASTOLIC BLOOD PRESSURE: 87 MMHG | SYSTOLIC BLOOD PRESSURE: 101 MMHG

## 2018-09-28 DIAGNOSIS — F25.0: Primary | ICD-10-CM

## 2018-09-28 DIAGNOSIS — Z59.0: ICD-10-CM

## 2018-09-28 DIAGNOSIS — F15.90: ICD-10-CM

## 2018-09-28 DIAGNOSIS — F12.90: ICD-10-CM

## 2018-09-28 DIAGNOSIS — T43.506A: ICD-10-CM

## 2018-09-28 RX ADMIN — PALIPERIDONE SCH MG: 3 TABLET, EXTENDED RELEASE ORAL at 10:36

## 2018-09-28 RX ADMIN — NICOTINE POLACRILEX PRN MG: 2 GUM, CHEWING BUCCAL at 14:58

## 2018-09-28 RX ADMIN — NICOTINE POLACRILEX PRN MG: 2 GUM, CHEWING BUCCAL at 10:36

## 2018-09-28 RX ADMIN — DIVALPROEX SODIUM SCH MG: 500 TABLET, EXTENDED RELEASE ORAL at 20:07

## 2018-09-28 RX ADMIN — NICOTINE POLACRILEX PRN MG: 2 GUM, CHEWING BUCCAL at 05:19

## 2018-09-28 RX ADMIN — ACETAMINOPHEN PRN MG: 325 TABLET ORAL at 05:18

## 2018-09-28 RX ADMIN — OLANZAPINE PRN MG: 10 TABLET, ORALLY DISINTEGRATING ORAL at 20:07

## 2018-09-28 SDOH — ECONOMIC STABILITY - HOUSING INSECURITY: HOMELESSNESS: Z59.0

## 2018-09-28 NOTE — HOSPPROG
Hospitalist Progress Note


Assessment/Plan: 





Neck mass - concern for malignancy.  S/P biopsy yesterday, await pathology 

while inpt.  Pt tolerating diet.  No pain





Schizoaffective / bipolar - He will return to inpt [facility] once plans are made 

regarding biopsy result.


   -cont Invega, Depakote   


   -D/C to [facility]H in 1-2 days





Homelessness - TB skin test placed 9/27, needs to be read Sat or Sun.





Full code





Dispo - cont inpt.  D/C back to inpt [facility] when biopsy results confirmed and 

oncology f/u determined.


Subjective: Pt feels fine. No pain from neck biopsy.  Denies SI or HI. No 

psychosis.  Overall, he is doing well.


Objective: 


 Vital Signs











Temp Pulse Resp BP Pulse Ox


 


 36.6 C   75   16   108/71   96 


 


 09/28/18 05:20  09/28/18 07:55  09/28/18 07:55  09/28/18 07:55  09/28/18 07:55








 Microbiology











 09/27/18 14:55 Gram Stain - Final





 Neck - Tissue 


 


 09/27/18 14:55 Fungal Culture - Final





 Neck - Tissue 








 











 09/27/18 09/28/18 09/29/18





 05:59 05:59 05:59


 


Intake Total 500 1400 236


 


Balance 500 1400 236














- Physical Exam


Constitutional: no apparent distress


Eyes: PERRL


Ears, Nose, Mouth, Throat: moist mucous membranes, other (left neck mass 

unchanged)


Cardiovascular: regular rate and rhythym


Respiratory: no respiratory distress


Gastrointestinal: normoactive bowel sounds


Skin: warm


Musculoskeletal: full muscle strength


Neurologic: AAOx3


Psychiatric: interacting appropriately





ICD10 Worksheet


Patient Problems: 


 Problems











Problem Status Onset


 


Neck malignant neoplasm Acute  


 


Schizoaffective disorder, bipolar type Chronic  


 


Acute psychosis Acute  


 


Cannabis use disorder, severe, dependence Acute  


 


Homelessness Acute  


 


Stimulant use disorder Acute
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
## 2018-09-28 NOTE — ASMTDCNOTE
Case Management Discharge

 

Discharge Order Complete?     Answers:  Yes                                   

Patient to Obtain             Answers:  Other                         Notes:  [facility] inpatient psych

Medications                                                                   

Transportation Arranged       Answers:  [facility] W/C                               

Transport will Pick (Date     09/28/2018 12:00 AM

& Time)                       

Case Management Transport     Answers:  Yes                           Notes:  PCS - [facility]

Form Complete                                                                 

Faxed Final Orders            Answers:  Yes                           Notes:  [facility] 3N

Family Notified               Answers:  No                            Notes:  no family

Discharge Comments            

Notes:

Patient is being d/c'ed back to  for psych support until his biopsy results return which will be 

early next week. The  with behavioral health will need to set up follow up 

appointments for patient when he is ready for d/c if patient does not return to the hospital for 

further medical intervention. CM is available to coordinate with  on addressing patient's medical 


needs. No further needs from acute care at this time.

 

Date Signed:  09/28/2018 03:58 PM

Electronically Signed By:Anjali Ospina LCSW

## 2018-09-28 NOTE — ASMTCASEMG
Living Arrangements

 

What is your living           Answers:  Alone                                 

arrangement? Who do you                                                       

live with?                                                                    

Type Of Residence

 

What kind of residence do     Answers:  Homeless                              

you live in?                                                                  

Discharge Plan Comments

 

Coordination Status           

Comments                      

Notes:

Patient is a 42yo single male with a hx of schizoaffective disorder, bipolar disorder who presented 


on 9/25/18 with command hallucinations telling him to kill himself. Patient was admitted to 

inpatient psychiatry on [facility] and started on antipsychotic medications. Patient noted 

left neck swelling and some difficulty swallowing. CT scan showed a mass concerning for malignancy 

and patient was admitted to [facility] for left neck mass. We are currently awaiting results of the 

biopsy. CM will follow.

 

Date Signed:  09/28/2018 03:51 PM

Electronically Signed By:Anjali Ospina LCSW

## 2018-09-28 NOTE — PDDCSUM
Discharge Summary


Discharge Summary: 





Date of Admission: 9/26/2018


Date of Discharge: 9/28/2018





Discharge diagnoses:  


1.  Neck mass s/p biopsy, pathology pending, suspicion high for malignancy such 

as SCC


2.  Schizophrenia / bipolar disorder with suicidal ideation





Consultants:


1.  Dr. Emmanuel Rubio, Oncology





Procedures:  Biopsy of left neck mass





Pending studies:  pathology on biopsy, culture and cytology





History and hospital course:  Mr Hernández is a 44 yo male with h/o schizophrenia 

versus bipolar disorder who was admitted to inpatient psychiatric unit for 

suicidality and psychosis.  He was discharged and sent to [facility] for admission to 

evaluate a left neck mass.  There was little concern for infection, but 

suspicion for cancer such as squamous cell carcinoma or lymphoma were high.  He 

underwent biopsy and pathology results are still pending.  His mental health 

status needed ongoing attention due to persistent suicidality and thus he was 

discharged from the hospital back to behavioral health.  Upon psychiatric 

stabilization and discharge from BEH, he will need an outpatient follow up 

appointment with [facility] to review biopsy results and 

determine a treatment plan.  This was communicated to our  at [facility] 

as well as inpatient behavioral health  to help arrange.





Discharge meds:  See [facility]





Follow up:


1.  CC for biopsy results of left neck mass


2.  Psychiatry
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
## 2018-09-29 RX ADMIN — NICOTINE POLACRILEX PRN MG: 2 GUM, CHEWING BUCCAL at 07:04

## 2018-09-29 RX ADMIN — PALIPERIDONE SCH MG: 3 TABLET, EXTENDED RELEASE ORAL at 09:06

## 2018-09-29 RX ADMIN — NICOTINE POLACRILEX PRN MG: 2 GUM, CHEWING BUCCAL at 10:59

## 2018-09-29 RX ADMIN — NICOTINE POLACRILEX PRN MG: 2 GUM, CHEWING BUCCAL at 12:22

## 2018-09-29 RX ADMIN — NICOTINE POLACRILEX PRN MG: 2 GUM, CHEWING BUCCAL at 19:11

## 2018-09-29 RX ADMIN — NICOTINE POLACRILEX PRN MG: 2 GUM, CHEWING BUCCAL at 14:45

## 2018-09-29 RX ADMIN — OLANZAPINE PRN MG: 10 TABLET, ORALLY DISINTEGRATING ORAL at 20:42

## 2018-09-29 RX ADMIN — NICOTINE POLACRILEX PRN MG: 2 GUM, CHEWING BUCCAL at 20:42

## 2018-09-29 RX ADMIN — DIVALPROEX SODIUM SCH: 500 TABLET, EXTENDED RELEASE ORAL at 22:11

## 2018-09-29 RX ADMIN — DIVALPROEX SODIUM SCH MG: 125 CAPSULE, COATED PELLETS ORAL at 22:15

## 2018-09-29 NOTE — ASMTCMCOM
CM Note

 

CM Note                       

Notes:

Pt and CC completed an updated MTP, signed and placed in chart. 



Pt. reports being very concerned about biopsy and wanting to get treatment as soon as 

possible. Pt. stated he now needs longer-term housing due to possibly needing treatment for growth 

in neck. Pt. denied SI, but reports "think about it". Pt. rated his SI thoughts a 6/10, and 

contracted for safety. Pt. reports "more depressing stuff on top of more depressing 

stuff". Pt. reports the MD he spoke with told him to "slow down'. Pt. denied SI, HI, and 

AVH. Pt. reports paranoia about "things not going right". Pt. showed CC his discharge paperwork 

with a referral to:



Emmanuel Rubio MD



[facility], [address] [address] [phone]





Pt. presents as alert, anxious, some eye contact, disheveled, and with a mostly pleasant 

demeanor. Staff report pt. sleeping 10 hours, and being medication complaint. 

 

Date Signed:  09/29/2018 01:13 PM

Electronically Signed By:Orquidea Bear

## 2018-09-29 NOTE — PDMN
Medical Necessity


Medical necessity: Pt meets IP criteria per NP & MCG B-[id number]; est los >2 mn 

for eval/tx of schizoaffective disorder, bipolar type; requiring further 

monitoring, safety, med management & crisis stabilization; per H&P & order 9/28/ 18
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
## 2018-09-29 NOTE — SOAPPROG
SOAP Progress Note


Assessment/Plan: 


Assessment:


44yo CM with hx of SZA d/o, THC and meth use also with hx of suicide attempts 

and AH recently transferred back to  from [facility] for biopsy of neck mass





09/29/18 16:00


slept 10hr





cooperative, thin CM, tattoos, good eye contact, engaging but with mildy 

restricted affect range. appears slightly anxious, admits feeling tired, but 

also worried about test results, housing, and c/o medications making him feel 

tired. 


states he has had some incr depression recently, SI 6/10 but no plan/intent and 

feels he can maintain safely here. does worry about neck mass, no results yet. +

AH of mumbling, no CAH, gone with distraction. did not appear responding to 

internal stimuli during interview. 


states he has low energy. hoping for longer term housing. encouraged to attend 

group therapy on unit. informed pt of option for spiritual consult


no other reported med s/e. no tremor, no ataxia, no EPS


 


PLAN:


Reported difficulty swallowing large Depakote tablets, changed to VPA sprinkles 

500mg tid


spiritual consult


continue paliperidone 6mg





Objective: 





 Vital Signs











Temp Pulse Resp BP Pulse Ox


 


 36.6 C   98   16   113/62   95 


 


 09/29/18 06:00  09/29/18 06:00  09/29/18 06:00  09/29/18 06:00  09/29/18 06:00














- Time Spent With Patient


Time Spent With Patient: 





20min





- Pending Discharge


Pending Discharge Within 24 Hours: No


Pending Discharge Within 48 Hours: No





ICD10 Worksheet


Patient Problems: 


 Problems











Problem Status Onset


 


Cannabis use disorder, severe, dependence Acute  


 


Homelessness Acute  


 


Neck malignant neoplasm Acute  


 


Stimulant use disorder Acute  


 


Schizoaffective disorder, bipolar type Chronic

## 2018-09-30 RX ADMIN — NICOTINE POLACRILEX PRN MG: 2 GUM, CHEWING BUCCAL at 14:37

## 2018-09-30 RX ADMIN — NICOTINE POLACRILEX PRN MG: 2 GUM, CHEWING BUCCAL at 10:54

## 2018-09-30 RX ADMIN — NICOTINE POLACRILEX PRN MG: 2 GUM, CHEWING BUCCAL at 17:26

## 2018-09-30 RX ADMIN — PALIPERIDONE SCH MG: 3 TABLET, EXTENDED RELEASE ORAL at 08:23

## 2018-09-30 RX ADMIN — NICOTINE POLACRILEX PRN MG: 2 GUM, CHEWING BUCCAL at 20:26

## 2018-09-30 RX ADMIN — NICOTINE POLACRILEX PRN MG: 2 GUM, CHEWING BUCCAL at 16:31

## 2018-09-30 RX ADMIN — NICOTINE POLACRILEX PRN MG: 2 GUM, CHEWING BUCCAL at 12:30

## 2018-09-30 RX ADMIN — DIVALPROEX SODIUM SCH MG: 125 CAPSULE, COATED PELLETS ORAL at 08:26

## 2018-09-30 RX ADMIN — ACETAMINOPHEN PRN MG: 325 TABLET ORAL at 07:29

## 2018-09-30 RX ADMIN — NICOTINE POLACRILEX PRN MG: 2 GUM, CHEWING BUCCAL at 23:35

## 2018-09-30 RX ADMIN — NICOTINE POLACRILEX PRN MG: 2 GUM, CHEWING BUCCAL at 07:30

## 2018-09-30 RX ADMIN — NICOTINE POLACRILEX PRN MG: 2 GUM, CHEWING BUCCAL at 18:37

## 2018-09-30 RX ADMIN — NICOTINE POLACRILEX PRN MG: 2 GUM, CHEWING BUCCAL at 08:26

## 2018-09-30 NOTE — SOAPPROG
SOAP Progress Note


Assessment/Plan: 


Assessment:





Objective: 





 Vital Signs











Temp Pulse Resp BP Pulse Ox


 


 36.7 C   93   16   117/67   96 


 


 09/30/18 06:00  09/30/18 06:00  09/30/18 06:00  09/30/18 06:00  09/30/18 06:00














- Time Spent With Patient


Time Spent With Patient: 





15min





- Pending Discharge


Pending Discharge Within 24 Hours: No


Pending Discharge Within 48 Hours: No





ICD10 Worksheet


Patient Problems: 


 Problems











Problem Status Onset


 


Cannabis use disorder, severe, dependence Acute  


 


Homelessness Acute  


 


Neck malignant neoplasm Acute  


 


Stimulant use disorder Acute  


 


Schizoaffective disorder, bipolar type Chronic

## 2018-09-30 NOTE — SOAPPROG
SOAP Progress Note


Assessment/Plan: 


Assessment:


44yo CM with hx of SZA d/o, THC and meth use also with hx of suicide attempts 

and AH recently transferred back to  from [facility] for biopsy of neck mass





09/29/18 16:00


slept 10hr





cooperative, thin CM, tattoos, good eye contact, engaging but with mildy 

restricted affect range. appears slightly anxious, admits feeling tired, but 

also worried about test results, housing, and c/o medications making him feel 

tired. 


states he has had some incr depression recently, SI 6/10 but no plan/intent and 

feels he can maintain safely here. does worry about neck mass, no results yet. +

AH of mumbling, no CAH, gone with distraction. did not appear responding to 

internal stimuli during interview. 


states he has low energy. hoping for longer term housing. encouraged to attend 

group therapy on unit. informed pt of option for spiritual consult


no other reported med s/e. no tremor, no ataxia, no EPS


 


PLAN:


Reported difficulty swallowing large Depakote tablets, changed to VPA sprinkles 

500mg tid


spiritual consult


continue paliperidone 6mg





09/30/18 14:32


slept 9.5hr. 





still feeling tired this AM. took zyprexa 10mg prn last night, stated this was 

offered b/c not sure if order for VPA sprinkles would get changed before he 

went to bed. didn't want sprinkles and have to have food with them. accepted 

offer for change to liquid. 


denies other physical c/o except sore throat. declines cepacol prn offer, 

rather prefers prn tylenol. 


expressed motivation to work with cm and [facility] after d/c





nml speech vol,rate, good ec, mood "okay", affect constricted, denied AH 

presently but states still with occasional AH mumbling, no current SI, denies 

thoughts to harm others. i/j fair.





Plan:


Does not want VPA sprinkles 500mg tid. Agreed to change to Depakene liquid, 

will order 750mg BID.


will need VPA level this week.  


agrees to try Invega at HS instead of AM, start tomorrow.


still awaiting bx results





Objective: 





 Vital Signs











Temp Pulse Resp BP Pulse Ox


 


 36.7 C   93   16   117/67   96 


 


 09/30/18 06:00  09/30/18 06:00  09/30/18 06:00  09/30/18 06:00  09/30/18 06:00














- Time Spent With Patient


Time Spent With Patient: 





20min





- Pending Discharge


Pending Discharge Within 24 Hours: No


Pending Discharge Within 48 Hours: No





ICD10 Worksheet


Patient Problems: 


 Problems











Problem Status Onset


 


Cannabis use disorder, severe, dependence Acute  


 


Homelessness Acute  


 


Neck malignant neoplasm Acute  


 


Stimulant use disorder Acute  


 


Schizoaffective disorder, bipolar type Chronic

## 2018-09-30 NOTE — ASMTCMCOM
CM Note

 

CM Note                       

Notes:

Pt. reports feeling "okay". Pt. stated he woke up 2-3 times last night, but slept okay. Pt. reports 


eating well and not really attending groups. Pt. stated one of his medications is causing him to 

"feel sluggish", adding he believes it is the Depakote. Pt. requested to stop the medication that 

is making him feel sluggish. Pt. denied SI, HI, and AVH. Pt. reports similar paranoia as 

yesterday, that things won't go right. CC discussed quitting smoking and gave pt. a [employer] 

referral card. Pt. stated he only wants to quit smoking if he does in fact have cancer. Pt. stated 

he will continue smoking at this time. 



Pt. presents as alert, less anxious than yesterday, with good eye contact and a pleasant 

demeanor. Staff report pt. sleeping 9.5 hours, being medication compliant and a 0/10 for SI. 

 

Date Signed:  09/30/2018 12:44 PM

Electronically Signed By:Orquidea Bear

## 2018-10-01 RX ADMIN — DIVALPROEX SODIUM SCH MG: 250 TABLET, DELAYED RELEASE ORAL at 20:52

## 2018-10-01 RX ADMIN — NICOTINE POLACRILEX PRN MG: 2 GUM, CHEWING BUCCAL at 17:27

## 2018-10-01 RX ADMIN — NICOTINE POLACRILEX PRN MG: 2 GUM, CHEWING BUCCAL at 06:30

## 2018-10-01 RX ADMIN — NICOTINE POLACRILEX PRN MG: 2 GUM, CHEWING BUCCAL at 20:59

## 2018-10-01 RX ADMIN — NICOTINE POLACRILEX PRN MG: 2 GUM, CHEWING BUCCAL at 15:52

## 2018-10-01 RX ADMIN — NICOTINE POLACRILEX PRN MG: 2 GUM, CHEWING BUCCAL at 08:41

## 2018-10-01 RX ADMIN — DIVALPROEX SODIUM SCH MG: 250 TABLET, DELAYED RELEASE ORAL at 09:04

## 2018-10-01 RX ADMIN — NICOTINE POLACRILEX PRN MG: 2 GUM, CHEWING BUCCAL at 11:45

## 2018-10-01 RX ADMIN — NICOTINE POLACRILEX PRN MG: 2 GUM, CHEWING BUCCAL at 13:47

## 2018-10-01 NOTE — ASMTBHDC
Notes

 

Note:                         

Notes:

Corrected discharge follow up:





Follow up with:



[facility]

[address]  [address], [facility]

Phone: [phone] 



Next Appt: Wednesday October 3rd (10/3/18) at 9am, with Richard on the second floor.**

 



From previous discharge paperwork:



Dr. Wendy DAS

[facility], [address] [place] [address]

Ph: [phone]



Next Appt: Wednesday October 10th (10/10/18) at 11:20 am with Dr. Nguyen.****



_____________________________________________



Additional Resources: 





[facility]****

[facility]

[address] [place], [place] [id number] [phone]





[facility]

Location: [address] [place] [address] [phone]

***Coordinated Entry must be done prior to discharge***



[facility]

[address] [place] [address]

[phone]

 

Date Signed:  10/01/2018 02:01 PM

Electronically Signed By:Amrit Hodges

## 2018-10-01 NOTE — ASMTBHDC
Notes

 

Note:                         

Notes:

CC confirmed client's discharge follow up plan.  CC will try to get client a Respite bed through 

[facility]; however, if none available client will have to stay at the shelter.**





Follow up with:



[facility]

[address]  [facility]

Phone: [phone] 



Next Appt: Wednesday October 3rd (10/3/18) at 9am, with Richard on the second floor.**

 



From previous discharge paperwork:



Emmanuel Rubio MD

[facility], [address] [place] [id number]

Ph: [phone]



Next Appt: Tuesday October 9th (10/09/18) at 11am with Dr. Rubio.****



_____________________________________________



Additional Resources: 





[facility]****

[facility]

[address] [place] [address] [phone]





[facility]

Location: [address] [place] [address] [phone]

***Coordinated Entry must be done prior to discharge***



[facility]

[address] [place] [address]

[phone]

 

Date Signed:  10/01/2018 11:28 AM

Electronically Signed By:Amrit Hodges

## 2018-10-01 NOTE — SOAPPROG
SOAP Progress Note


Assessment/Plan: 


Assessment:





Schizoaffective disorder, bipolar type, Stimulant Use Disorder, Severe, 

Cannabis Use Disorder, Severe.  Homelessness.  Improvement noted. (see 

subjective/objective note).  Patient could benefit from continued inpatient 

hospitalization for crisis stabilization (recent AH, history recent suicide 

attempts), observation of safety (recent report of SI and history of recent 

serious suicide attempts), and medication evaluation (patient reported AH at 

time of admission; could benefit from continued monitoring for efficacy and 

toleration of medications).





Plan:





(1) Psychotropic medications: After reviewing options, risks, and benefits 

patient agrees to continue current medications.  No medication changes at this 

time as more time is needed to determine ongoing tolerability and efficacy.  

Plan is to continue to observe patient for response and side effects from 

medications, and ongoing monitoring and evaluation.  


(2) Review with patient informed consent and recommendations for psychotropic 

medication treatment listed below


(3) Labs: no additional labs at this time


(4) Therapy: continue milieu and group therapy  


(5) Further investigation including gathering information from patients 

relatives and review of past case records to inform treatment plan.


(6) Safety/Wellness plan and follow-up outpatient appointments to be 

established prior to discharge.  Next steps are for patient to meet with care 

manager to plan a safe discharge plan and establish outpatient services for 

ongoing treatment.  


(7) Confer with inpatient treatment team regarding treatment plan.  


(8) Legal status: voluntary


(9) Consider discharge on Tuesday if patient is in stable condition, safe, and 

has a safe discharge plan.   


(10) Substance abuse intervention: cannabis and methamphetamine





PSYCHOTROPIC MEDICATION TREATMENT INFORMED CONSENT and RECOMMENDATIONS: Review 

nature of condition, diagnosis, and prognosis.  Review nature and purpose of 

psychotropic medication treatment.  Review type of psychotropic medications 

being ordered.  Review risk and benefits of psychotropic medication treatment.  

Review probable length of time patient will need to take medications.  Review 

risk and benefits of not undergoing psychotropic medication treatment.  Review 

alternative treatments to psychotropic medications.  Review psychotropic 

medications contraindications, drug-drug interactions, side effects, and 

importance of reporting any side effects to a psychiatric provider or nurse 

during inpatient hospitalization, and upon discharge to patients psychiatric 

outpatient provider, primary care provider, or other health care professional.  

Review importance of asking a nurse, psychiatric provider, or primary care 

provider any questions or problems concerning the psychotropic medications.  

Verify patient understands the information that has been provided, and 

understands, accepts, and agrees to psychotropic medications.  





Review patients safety plan and importance of patient to report to staff while 

hospitalized if patient is ever a danger to self/others, or unable to care for 

self, and upon discharge, the importance for patient to contact [facility] or [facility], or go to the nearest emergency room, if patient is ever a 

danger to self/others, or unable to care for self.  Recommend that upon 

discharge patient establish medication management treatment with a psychiatric 

provider, establishes routine therapy appointments, and follow-up with primary 

care provider.  Verify patient understands and agrees to these recommendations.





10/01/18 07:57





Subjective: 


Following up with patient for evaluation of markel, psychosis, and safety.  

Patient reports, "No longer hearing voices, feel fine.  Did my results from 

biopsy come back yet?  I would really appreciate a place to stay.  Can you set 

me up with a place to stay after I discharge?"  Patient expresses the following 

psychiatric symptoms none.  Patient reports taking medications as prescribed, 

and describes response to medications as good.  Patient does not report 

undesirable side effects from the medications, and agrees to continue current 

medications.  Patient reports appetite as good, and reports eating all meals.  

Patient describes getting 8 hours of sleep.





Objective: 





 Vital Signs











Temp Pulse Resp BP Pulse Ox


 


 36.4 C   138 H  20   95/57 L  95 


 


 10/01/18 06:00  10/01/18 06:00  10/01/18 06:00  10/01/18 06:00  10/01/18 06:00








NURSING REPORT: Consulted with nursing for update on patients progress in 

treatment.  Nurses report patient is engaged in treatment, is attending groups, 

slept 8 hours, expresses the following psychiatric symptoms: none, exhibits the 

following psychiatric symptoms: none, is eating all meals, is agreeable to 

medications and taking as prescribed with no report of side effects, with no s/

s of EPS/akathisia, and denies SI/HI, denies A/V hallucinations, and denies 

delusions.





CASE COORDINATOR UPDATE: establishing discharge plan for tomorrow





MSE: The patient is a well-nourished male looking stated chronological age.  

Attire is appropriate and dress is casual and is neat.  Grooming status is 

appropriate and neat.  Ambulation is independent.  Gait is normal and 

coordinated.  Posture is normal and relaxed.  Eye contact is appropriate, and 

adequate.  Motor activity is appropriate with purposeful, organized, 

coordinated movements; with no involuntary movements.  Attitude is cooperative 

and friendly.  Patient appears attentive and relates well to this interviewer.  

Language production is spontaneous.  R/R/V normal.  Articulation is clear.  

Patient reports mood as okay with congruent affect.  Patients thought process 

is linear and logical, with no loose associations, tangential thought, thought 

blocking, concrete thinking, or any other signs of formal thought disorder.  

Associations are connected.  Patient does not report suicidal/homicidal thoughts

, ideas, or plans.  Patient denies auditory, visual hallucinations.  Patient 

denies delusions.  Patient does not appear to be attending to internal stimuli.

  Patients attention and concentration are adequate.  Patient is oriented to 

person, place, time, and situation.  Patients insight is fair.  Patients 

judgment is fair.  No evidence of gross cognitive dysfunction at any point 

during the interview.  No evidence of apparent dysfunction in recent or remote 

memory.





SUBSTANCE ABUSE BRIEF INTERVENTION: Brief intervention regarding the risks of 

methamphetamine and cannabis abuse is provided to patient with goal to reduce 

the risk of harm that could result from the continued use of methamphetamine 

and cannabis, with the general aim to investigate the problem, raise awareness 

of problem, develop a solution with the patient, recommend a specific change or 

activity, and motivate the patient toward change.  Assess substance abuse 

behavior and give supportive advice about harm reduction, recommend a reduction 

in hazardous/at-risk consumption patterns, and facilitate referrals for 

additional specialized treatment with care coordinator.  Intermediate goal is 

for the patient to quit use and attend NA meetings and OP substance abuse 

treatment.  Intervention focus on intermediate goals to allow for more 

immediate success in the treatment process to keep the patient motivated.  

Review following with patient: Cannabis use risks: Short-term use: impaired 

short-term memory, impaired motor coordination, altered judgement, in high 

doses paranoia and psychosis.  Long-term use addiction, diminished life 

satisfaction and achievement, symptoms of chronic bronchitis, and increased 

risk of chronic psychosis disorders if predisposition to such disorders.  In 

withdrawal anger, aggression irritability, anxiety and nervousness, decreased 

appetite or weight loss, restlessness, and sleep difficulties with strange 

dreams.  Methamphetamine use risks: Short-term: insomnia, irritability, 

aggressive behavior, hallucinations, delusions, intellectual deficits, anxiety, 

depression, convulsions, damage to blood vessels in the brain causing strokes, 

high fevers, collapse of the circulatory system. Long-term: damage to nerve 

pathways, maybe irreversibly; overstimulation to dopamine impairing dopamine 

transport and reducing efficiency of dopamine receptors, the reward system 

becomes worn out, leading to inability to experience pleasure for years.





- Time Spent With Patient


Time Spent With Patient: 


15 minutes, met with patient individually.








- Pending Discharge


Pending Discharge Within 24 Hours: Yes


Pending Discharge Within 48 Hours: No


Pending Discharge Date: 10/02/18


Pending Discharge Time: 11:00





ICD10 Worksheet


Patient Problems: 


 Problems











Problem Status Onset


 


Acute psychosis Acute  


 


Cannabis use disorder, severe, dependence Acute  


 


Homelessness Acute  


 


Neck malignant neoplasm Acute  


 


Stimulant use disorder Acute  


 


Schizoaffective disorder, bipolar type Chronic

## 2018-10-02 VITALS — DIASTOLIC BLOOD PRESSURE: 71 MMHG | SYSTOLIC BLOOD PRESSURE: 162 MMHG

## 2018-10-02 RX ADMIN — NICOTINE POLACRILEX PRN MG: 2 GUM, CHEWING BUCCAL at 07:53

## 2018-10-02 RX ADMIN — ACETAMINOPHEN PRN MG: 325 TABLET ORAL at 08:48

## 2018-10-02 RX ADMIN — DIVALPROEX SODIUM SCH MG: 250 TABLET, DELAYED RELEASE ORAL at 07:53

## 2018-10-02 NOTE — BDS
REASON FOR ADMISSION:  The patient was admitted involuntarily and was admitted 
for safety crisis stabilization and medication management.  The patient was 
admitted for psychosis, notably auditory hallucinations.



ADMITTING DIAGNOSES:  

1.  Schizoaffective disorder, bipolar type.

2.  Stimulant use disorder.

3.  Severe cannabis use disorder, severe.

4.  Homelessness 

5.  Rule out malingering.



ADMISSION PHYSICAL EXAM:  The patient was discharged from [facility] on 
09/28/2018.  The patient was discharged and sent to [facility] for a biopsy for 
a neck mass.  The patient was discharged awaiting pending pathology results.  
The patient was medically cleared for readmission for inpatient psychiatric 
hospitalization and treatment.  For further details, please refer to the 
discharge summary dated 09/28/2018.  The patient was initially seen for a 
history and physical consultation on 09/25/2018, from prior inpatient 
psychiatric hospitalization prior to being discharge for further evaluation of 
neck mass.  The patient was seen on 09/25/2018, by Dr. Joshi for an Internal 
Medicine consultation for medical clearance for inpatient behavioral health 
stay.  Dr. Joshi reported he saw no medical contraindications to the patient's 
continued stay in the inpatient behavioral health unit or to any psychiatric 
medications or procedures.



ADMISSION LABS:  CBC from 09/24/2018, within normal limits.  Chemistry from 09/
24/2018, within normal limits.  Hemoglobin A1c from 09/24/2018, was within 
normal limits at 5.7.  Liver function tests from 09/24/2018, within normal 
limits.  Fasting lipid panel from 09/24/2018, within normal limits, except LDL 
cholesterol calculated was elevated at 104.  TSH from 09/24/2018, was 1.510 and 
was within normal limits.  Toxicology screen from 09/24/2018 and from 09/26/2018
, negative for all substances of abuse.  Negative for ethyl alcohol.  Valproic 
acid level from 10/01/2018, was 52.9 and that is at the current dose of 
Depakote 750 mg p.o. b.i.d.



MAJOR PROCEDURES OR TESTS:  There were no major procedures or tests performed 
while patient was hospitalized at [address] [place] [address].  The 
patient was discharged on 09/26/2018, and was sent to the [facility] 
for procedures and tests related to a neck mass.  For further details, please 
refer to those notes and consultations.  The patient is currently awaiting 
biopsy results and patient is to follow up with [facility] 
for those results and further treatment recommendations.



HOSPITAL COURSE:  The most prominent symptoms and behaviors while the patient 
was here were complaints of auditory hallucinations.  Target symptoms during 
hospitalization:  Psychosis.  Treatment modalities utilized were milieu and 
group therapy.  Depakote 750 mg p.o. b.i.d. was tolerated with no report of 
side effects and with good response.  Invega ER 6 mg p.o. daily was tolerated 
with no report of side effects and with good response.  The patient has 
improved considerably with no signs of psychiatric symptoms and no psychiatric 
symptoms expressed at time of discharge.  The patient reports he has improved 
since admission, states to be in stable condition, feels safe to discharge and 
he contracts for safety.  The patient's response to treatment was good.  There 
were no adverse or unexpected results of treatment.  The patient was safe 
throughout his stay, active in treatment, engaged in groups, and was 
appropriate with staff and other patients.  The patient met with the treatment 
team prior to discharge to assess readiness to discharge and review discharge 
plan.  The treatment team consensus is the patient is in stable condition, has 
a safe discharge plan and is ready to discharge today.



CONDITION AT DISCHARGE:  Patient is in stable condition and is no longer a 
danger to self or others, and is not gravely disabled due to mental illness.  
Patient is no longer in need of inpatient level of care, and can be safely and 
effectively treated within the community.  The patients level of risk at time 
of discharge is low.  MSE: The patient is casually dressed and with good hygiene
, and looks stated age.  Patient is sitting, posture is upright, and position 
is relaxed.  Patient appears awake, alert, and responds appropriately and 
reasonably during interview.  Patient is engaged, relates well to interviewer, 
and emotional facial expression is appropriate to situation and changes 
appropriately with topic.  Patient is cooperative, makes comfortable eye contact
, and movements are voluntary, deliberate, coordinated, and smooth and even 
with no inappropriate movements.  Patient makes laryngeal sounds effortlessly 
and shares conversation appropriately; pace of conversation is appropriate, and 
stream of talking is fluent; articulation is clear and understandable; word 
choice is effortless and appropriate for education level; completes sentences, 
occasionally pausing to think; rate and volume are appropriate for interview 
and setting.  Patient reports mood as euthymic.  Patients affect is stable with 
full variable range, congruent with mood, and appropriate to speech and 
circumstances.  Patient has linear and logical thinking, with no loose 
associations, tangential thought, thought blocking, concrete thinking, or any 
other signs of formal thought disorder.  Patient denies suicidal and homicidal 
ideation, and denies hallucinations and delusions.  Patient appears to be a 
reliable historian with sound judgement and good insight into current 
condition.  Patient has no apparent dysfunction in recent or remote memory noted
, and no evidence of gross cognitive dysfunction noted at any point during the 
interview.



DISCHARGE DIAGNOSES:  

1.  Schizoaffective disorder, bipolar type.

2.  Stimulant use disorder.

3.  Severe cannabis use disorder, severe.

4.  Homelessness 

5.  Rule out malingering.



CURRENT MEDICATIONS:  After reviewing options, risks and benefits with the 
patient, the patient agrees to continue Depakote 750 mg p.o. b.i.d. and Invega 
ER 6 mg p.o. daily.  The patient requests prescriptions for these medications 
at the time of discharge.  Prescriptions are written for 30 days and provided 
to the patient.  The prescriptions are reviewed with the patient at time of 
discharge to ensure accuracy and patient understanding.



DISPOSITION:  The patient left the hospital independently and voluntarily today 
and plans to stay at the homeless shelter in [place].



FOLLOWUP:  Care coordinator reports the appropriate outpatient follow-up 
services have been established and outpatient appointments have been scheduled.
  The patient received written instructions with times and dates of outpatient 
follow-up appointments.  The following follow-up recommendations were provided 
to the patient at discharge: Continue psychotropic medications as prescribed 
and attend appointments as scheduled.  Report any side effects to a psychiatric 
outpatient provider, a primary care provider, or other health care 
professional.  Address any questions or problems concerning the psychotropic 
medications with a psychiatric outpatient provider, a primary care provider, or 
other health care professional.  Contact [facility] or [facility], or go 
to the nearest emergency room, if you are ever a danger to yourself/others, or 
unable to care for yourself.  As soon as possible, establish a routine 
medication management treatment with a psychiatric provider, establish routine 
therapy appointments, and follow-up with a primary care provider.  



SUBSTANCE ABUSE BRIEF INTERVENTION: Brief intervention regarding the risks of 
methamphetamine and cannabis abuse is provided to patient with goal to reduce 
the risk of harm that could result from the continued use of methamphetamine 
and cannabis, with the general aim to investigate the problem, raise awareness 
of problem, develop a solution with the patient, recommend a specific change or 
activity, and motivate the patient toward change.  Assess substance abuse 
behavior and give supportive advice about harm reduction, recommend a reduction 
in hazardous/at-risk consumption patterns, and facilitate referrals for 
additional specialized treatment with care coordinator.  Intermediate goal is 
for the patient to quit to quit and engage in outpatient substance abuse 
treatment.  Intervention focus on intermediate goals to allow for more 
immediate success in the treatment process to keep the patient motivated.  
Review following with patient: Cannabis use risks: Short-term use: impaired 
short-term memory, impaired motor coordination, altered judgement, in high 
doses paranoia and psychosis.  Long-term use addiction, diminished life 
satisfaction and achievement, symptoms of chronic bronchitis, and increased 
risk of chronic psychosis disorders if predisposition to such disorders.  In 
withdrawal anger, aggression irritability, anxiety and nervousness, decreased 
appetite or weight loss, restlessness, and sleep difficulties with strange 
dreams.  Methamphetamine use risks: Short-term: insomnia, irritability, 
aggressive behavior, hallucinations, delusions, intellectual deficits, anxiety, 
depression, convulsions, damage to blood vessels in the brain causing strokes, 
high fevers, collapse of the circulatory system. Long-term: damage to nerve 
pathways, maybe irreversibly; overstimulation to dopamine impairing dopamine 
transport and reducing efficiency of dopamine receptors, the reward system 
becomes worn out, leading to inability to experience pleasure for years.  



OUTPATIENT SUBSTANCE ABUSE TREATMENT: Patient referred to outpatient provider 
and treatment for continued treatment related to substance abuse.



LEGAL COURSE:  The patient was admitted voluntarily and remained voluntarily 
throughout his stay.  The patient discharged today independently and 
voluntarily.



ATTITUDE AT TIME OF DISCHARGE:  The patients attitude was positive at time of 
discharge, and patient reports looking forward to discharging today.  The 
patient reports he feels safe to discharge, is no longer a danger to himself or 
others, is in stable condition, and contracts for safety.  Patient states he 
will continue medications as prescribed, and establish medication management 
treatment with an outpatient provider after discharge.  Patient reports he 
understands the information that has been provided to him, and he understands, 
accepts, and agrees to psychotropic medications.  Patient describes internal 
protective factors as the coping skills he has learned while hospitalized here, 
and he plans to continue to practice these coping skills after discharge.  



LABS AND STUDIES:  There were no pending labs or studies from this inpatient 
psychiatric hospitalization.  The patient is awaiting biopsy results from 
biopsy the patient received at [facility] and patient instructed to 
follow up with [facility] after discharge for results and to 
review treatment recommendations.



ADVANCED DIRECTIVES:  There were no advance directives on file, and the patient 
was full code during this hospitalization.



The following psychotropic medication treatment informed consent and 
recommendations were provided to the patient at time of discharge.  Patient 
reports he understands, accepts, and agrees to the information that has been 
provided.   



PSYCHOTROPIC MEDICATION TREATMENT INFORMED CONSENT and RECOMMENDATIONS: Review 
nature of condition, diagnosis, and prognosis.  Review nature and purpose of 
psychotropic medication treatment. Review type of psychotropic medications 
being prescribed.  Review risk and benefits of psychotropic medication 
treatment.  Review probable length of time will need to take medications.  
Review risk and benefits of not undergoing psychotropic medication treatment.  
Review alternative treatments to psychotropic medications.  Review psychotropic 
medications contraindications, side effects, and importance of reporting any 
side effects to a psychiatric provider, primary care provider, or other health 
care professional.  Review importance of her asking a psychiatric provider or 
primary care provider any questions or problems concerning the psychotropic 
medications.  



Review safety plan and the importance to contact [facility] or [facility]
, or go to the nearest emergency room, if ever a danger to yourself/others, or 
unable to care for yourself.  Recommend upon discharge to establish routine 
medication management treatment with a psychiatric provider, establish routine 
therapy appointments, and follow-up with a primary care provider.  Verify 
patient understands, accepts, and agrees to the information that has been 
provided. 



Job #:  [id number]

MTDD

## 2018-10-10 ENCOUNTER — HOSPITAL ENCOUNTER (INPATIENT)
Dept: HOSPITAL 80 - FED | Age: 43
LOS: 5 days | Discharge: HOME | DRG: 885 | End: 2018-10-15
Attending: NURSE PRACTITIONER | Admitting: NURSE PRACTITIONER
Payer: MEDICAID

## 2018-10-10 DIAGNOSIS — F12.90: ICD-10-CM

## 2018-10-10 DIAGNOSIS — F25.0: Primary | ICD-10-CM

## 2018-10-10 DIAGNOSIS — K21.9: ICD-10-CM

## 2018-10-10 DIAGNOSIS — F17.210: ICD-10-CM

## 2018-10-10 DIAGNOSIS — Z76.5: ICD-10-CM

## 2018-10-10 DIAGNOSIS — B19.20: ICD-10-CM

## 2018-10-10 DIAGNOSIS — I10: ICD-10-CM

## 2018-10-10 DIAGNOSIS — F15.90: ICD-10-CM

## 2018-10-10 DIAGNOSIS — Z59.0: ICD-10-CM

## 2018-10-10 LAB — PLATELET # BLD: 342 10^3/UL (ref 150–400)

## 2018-10-10 PROCEDURE — G0480 DRUG TEST DEF 1-7 CLASSES: HCPCS

## 2018-10-10 RX ADMIN — DIVALPROEX SODIUM SCH MG: 500 TABLET, EXTENDED RELEASE ORAL at 20:40

## 2018-10-10 SDOH — ECONOMIC STABILITY - HOUSING INSECURITY: HOMELESSNESS: Z59.0
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
## 2018-10-10 NOTE — ASMTTCLDSP
TLC Discharge Disposition

 

Disposition:                  Answers:  Admit                                 

Disposition Notes:            

Notes:

Admit 3N.

Discharge                     

Concerns/Recommendations:     

Notes:

In consultation with [facility] ED physician, Gurdeep Trujillo MD and on-call psychiatric nurse 

practitioner, CT Lee, both concurred that pt appears to meet 27-65 criteria requiring 

psychiatric hospitalization as pt appears to be at risk of harm to self due to a mental illness 

condition. 

Was patient given the         Answers:  Yes                                   

Inpatient Behavioral                                                          

Health Prohibited                                                             

Belongings List while in                                                      

the ED?                                                                       

For inpatient                 CT Lee

admission, the following      

psychiatrist agreed to        

accept patient for            

admission to Behavioral       

Health ([facility]):              

Type of Hold:                 Answers:  M1/72-hour Hold                       

Hold initiated by:            Answers:  ED Physician                          

 

Date Signed:  10/10/2018 03:10 PM

Electronically Signed By:Derick Baker

## 2018-10-10 NOTE — EDPHY
H & P


Stated Complaint: Feeling "suicidal".


Time Seen by Provider: 10/10/18 13:59


HPI/ROS: 





CHIEF COMPLAINT: "I just want to kill myself"





HISTORY OF PRESENT ILLNESS:  43-year-old homeless male history of 

schizoaffective disorder, history of methamphetamine use 3 days ago, took the 

bus to the ER complaining of worsening depression with plan to lacerated 

wrists.  Denies attempt.  Denies hallucination.  Denies alcohol use.  Denies 

self-injurious behavior.  Denies complaints of physical pain








REVIEW OF SYSTEMS:


10 systems reviewed and negative with the exception of the elements mentioned 

in the history of present illness








PAST MEDICAL & SURGICAL  HISTORY:  Schizoaffective disorder.





SOCIAL HISTORY: Last methamphetamine use 3 days ago    





FAMILY HISTORY: No pertinent family history    








************


PHYSICAL EXAM





(Prior to examination, patient consented to physical exam, hands were washed 

and my usual and customary physical exam procedures followed)


1) GENERAL: poorly kept, dirty, untidy, depressed flat affect.


2) HEAD: Normocephalic, atraumatic


3) HEENT: Pupils equal, round, reactive to light bilaterally.  Sclera 

anicteric. 


4) NECK: Full range of motion, no meningeal signs.


5) LUNGS: Clear auscultation bilaterally, no wheezes, no rhonchi, no 

retractions.   


6) HEART: Regular rate and rhythm, no murmur, no heave, no gallop.


7) ABDOMEN: No guarding, no rebound, no focal tenderness, negative McBurney's, 

negative Harper's, negative Rovsing's, negative peritoneal sign,


8) MUSCULOSKELETAL: Moving all extremities, no focal areas of tenderness, no 

obvious trauma.  No peripheral edema or discoloration.


9) BACK: No obvious trauma, no visual or palpable abnormality. 


10) SKIN: No rash, no petechiae. 


11) Psychiatric:  Patient is oriented X 3, there is no agitation.  Depressed, 

flat affect








***************





DIFFERENTIAL DIAGNOSIS:  In no particular order including but not limited to 

depression, suicidal ideation, homicidal ideation 








- Personal History


Current Tetanus Diphtheria and Acellular Pertussis (TDAP): No





- Medical/Surgical History


Hx Asthma: No


Hx Chronic Respiratory Disease: No


Hx Diabetes: No


Hx Cardiac Disease: No


Hx Renal Disease: No


Hx Cirrhosis: No


Hx Alcoholism: No


Hx HIV/AIDS: No


Hx Splenectomy or Spleen Trauma: No


Other PMH: hep c, hypertension, iv drug addiction, gerd, depression.





- Social History


Smoking Status: Heavy smoker


Constitutional: 


 Initial Vital Signs











Heart Rate  103 H  10/10/18 13:51


 


Respiratory Rate  18   10/10/18 13:51


 


Blood Pressure  120/68   10/10/18 13:51


 


O2 Sat (%)  91 L  10/10/18 13:51








 











O2 Delivery Mode               Room Air














Allergies/Adverse Reactions: 


 





No Known Allergies Allergy (Verified 10/10/18 14:52)


 








Home Medications: 














 Medication  Instructions  Recorded


 


Acetaminophen [Tylenol 325mg (*)] 650 mg PO Q4HRS PRN  tab 09/26/18


 


Nicotine Polacrilex [Nicorette gum 2 mg B Q1HR PRN  gum 09/26/18





(*)]  


 


Divalproex [Depakote] 750 mg PO BID 30 Days #180 tab 10/02/18


 


Paliperidone [Invega] 6 mg PO DAILY 30 Days #30 tab.er.24 10/02/18














Medical Decision Making


ED Course/Re-evaluation: 





2:10 p.m.:  I reviewed the patient's old medical records including recent 

hospitalization of approximately 2 weeks ago.  Patient last used 

methamphetamine 3 days ago via inhalation.  Patient presents to the ER 

complaining of suicidal ideation with plan to lacerate wrist.  Consultation 

with Dr. Gurdeep Trujillo in the ER the patient was placed on an M1 hold at 2:08 

p.m..





2:50 p.m.:  Informed by mental health evaluator Derick Goodman that patient 

has been accepted for transfer, [address] 

accepting physician Dr Martinez. [facility] paperwork completed.





- Data Points


Laboratory Results: 


 Laboratory Results





 10/10/18 14:30 





 10/10/18 14:30 





 











  10/10/18 10/10/18 10/10/18





  14:30 14:30 14:30


 


WBC      8.05 10^3/uL 10^3/uL





     (3.80-9.50) 


 


RBC      5.33 10^6/uL 10^6/uL





     (4.40-6.38) 


 


Hgb      16.0 g/dL g/dL





     (13.7-17.5) 


 


Hct      46.9 % %





     (40.0-51.0) 


 


MCV      88.0 fL fL





     (81.5-99.8) 


 


MCH      30.0 pg pg





     (27.9-34.1) 


 


MCHC      34.1 g/dL g/dL





     (32.4-36.7) 


 


RDW      13.2 % %





     (11.5-15.2) 


 


Plt Count      342 10^3/uL 10^3/uL





     (150-400) 


 


MPV      9.0 fL fL





     (8.7-11.7) 


 


Neut % (Auto)      69.7 % %





     (39.3-74.2) 


 


Lymph % (Auto)      20.2 % %





     (15.0-45.0) 


 


Mono % (Auto)      9.7 % %





     (4.5-13.0) 


 


Eos % (Auto)      0.0 % L %





     (0.6-7.6) 


 


Baso % (Auto)      0.2 % L %





     (0.3-1.7) 


 


Nucleat RBC Rel Count      0.0 % %





     (0.0-0.2) 


 


Absolute Neuts (auto)      5.60 10^3/uL 10^3/uL





     (1.70-6.50) 


 


Absolute Lymphs (auto)      1.63 10^3/uL 10^3/uL





     (1.00-3.00) 


 


Absolute Monos (auto)      0.78 10^3/uL 10^3/uL





     (0.30-0.80) 


 


Absolute Eos (auto)      0.00 10^3/uL L 10^3/uL





     (0.03-0.40) 


 


Absolute Basos (auto)      0.02 10^3/uL 10^3/uL





     (0.02-0.10) 


 


Absolute Nucleated RBC      0.00 10^3/uL 10^3/uL





     (0-0.01) 


 


Immature Gran %      0.2 % %





     (0.0-1.1) 


 


Immature Gran #      0.02 10^3/uL 10^3/uL





     (0.00-0.10) 


 


Sodium    140 mEq/L mEq/L  





    (135-145)  


 


Potassium    4.4 mEq/L mEq/L  





    (3.3-5.0)  


 


Chloride    102 mEq/L mEq/L  





    ()  


 


Carbon Dioxide    27 mEq/l mEq/l  





    (22-31)  


 


Anion Gap    11 mEq/L mEq/L  





    (8-16)  


 


BUN    25 mg/dL H mg/dL  





    (7-23)  


 


Creatinine    0.8 mg/dL mg/dL  





    (0.7-1.3)  


 


Estimated GFR    > 60   





    


 


Glucose    104 mg/dL H mg/dL  





    ()  


 


Calcium    10.0 mg/dL mg/dL  





    (8.5-10.4)  


 


Salicylates    < 1.0 mg/dL L mg/dL  





    (2.0-20.0)  


 


Urine Opiates Screen  NEGATIVE     





   (NEGATIVE)   


 


Acetaminophen    < 10 mcg/mL L mcg/mL  





    (10-30)  


 


Urine Barbiturates  NEGATIVE     





   (NEGATIVE)   


 


Ur Phencyclidine Scrn  NEGATIVE     





   (NEGATIVE)   


 


Ur Amphetamine Screen  NEGATIVE     





   (NEGATIVE)   


 


U Benzodiazepines Scrn  NEGATIVE     





   (NEGATIVE)   


 


Urine Cocaine Screen  NEGATIVE     





   (NEGATIVE)   


 


U Marijuana (THC) Screen  NEGATIVE     





   (NEGATIVE)   


 


Ethyl Alcohol    < 10 mg/dL mg/dL  





    (0-10)  














Departure





- Departure


Disposition: [facility] IP


Clinical Impression: 


 Schizoaffective disorder, bipolar type, Suicidal ideation





Condition: Fair


Referrals: 


NONE *PRIMARY CARE P,. [Primary Care Provider] - As per Instructions

## 2018-10-11 RX ADMIN — DIVALPROEX SODIUM SCH: 500 TABLET, EXTENDED RELEASE ORAL at 21:11

## 2018-10-11 RX ADMIN — DIVALPROEX SODIUM SCH MG: 500 TABLET, EXTENDED RELEASE ORAL at 20:52

## 2018-10-11 NOTE — ASMTCMCOM
CM Note

 

CM Note                       

Notes:

Pt. and CC completed MTP, signed and placed in pt. chart. 



Pt. reports "not feeling very good" adding he has feelings of depression. Pt. stated he was 

currently having AH of voices "just talking in my head" adding there are "a lot of them 

[voices]". Pt. denied SI, HI, VH and paranoia. Pt. stated he can go to the homeless shelter upon 

discharge. Pt. stated he did get a respite bed through [facility], but met someone there on the first 

day, used Meth and felt too guilty to return to the respite bed. Pt. reports feeling hopeless about 


his current situation.



Pt. presents as alert, anxious, avoiding eye contact, delayed responses, and disheveled. Staff 

report pt. sleeping 10 hours and being medication complaint.

 

Date Signed:  10/11/2018 12:47 PM

Electronically Signed By:Orquidea Bear
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
## 2018-10-11 NOTE — ASMTBHMTP
Master Treatment Plan

 

Master Treatment Plan         Answers:  Depressed Mood with                   

for:                                    Suicidal Ideation                     

Date:                         10/11/2018

Diagnosis on Admission:       Schizoaffective Disorder, Bipolar Type 295.70

Expected length of stay:      3-5 Days

Reason for admission:         

Notes:

Per TLC evaluation - Pt. is a 43 year old single, homeless, disabled,  male with history 

of schizoaffective disorder-bipolar type, methamphetamine use disorder, and cannabis use 

disorder, moderate, initially self-presented to [facility] ED on a voluntary basis after taking a bus to 

the ED. He was placed on M1 hold by ED provider which noted: "Jones took bus to ER, complaining of 


suicidal ideation with plan to lacerate wrists. History of schizoaffective disorder." Pt appeared 

older than his stated age, appeared disheveled, unclean, thin, however, was cooperative and polite 

throughout ED MH interview process. He also appeared to be responding to internal stimuli, with 

thought blocking and delayed responses. 

Patient's stated              

presenting problems:          

Notes:

Suicidal thoughts and hearing voices

Patient's goals for           

treatment:                    

Notes:

Work on things

Patient's strengths:          

Notes:

Not sure

Identify supports outside     

of hospital:                  

Notes:

Don't have anyone

Discharge criteria:           

Notes:

Suicidal ideation will resolve and patient will have a plan to safely manage recurrent suicidal 

ideation. 

Initial disposition           

plan/considerations:          

Notes:

Homeless and can return to the shelter in [place]

Master Treatment Plan Required Signatures

 

Psychiatrist signature:       Answers:  Psychiatrist: ______________________________

RN on-shift signature:        Answers:  RN: ____________________________________

Patient signature:            Answers:  Patient: ____________________________________

 

Date Signed:  10/11/2018 08:46 AM

Electronically Signed By:Orquidea Bear

## 2018-10-12 RX ADMIN — NICOTINE POLACRILEX PRN MG: 2 GUM, CHEWING BUCCAL at 16:05

## 2018-10-12 RX ADMIN — NICOTINE POLACRILEX PRN MG: 2 GUM, CHEWING BUCCAL at 18:18

## 2018-10-12 RX ADMIN — DIVALPROEX SODIUM SCH MG: 500 TABLET, EXTENDED RELEASE ORAL at 21:35

## 2018-10-12 NOTE — SOAPPROG
SOAP Progress Note


Assessment/Plan: 


Assessment:





Schizoaffective disorder, currently depressed and psychotic.  Stimulant use 

disorder, severe.  Stimulant withdrawal.  No improvement noted. (see subjective/

objective note).  Patient is not safe to discharge at this time as patient 

continues to exhibit signs of acute psychosis, and express depression, 

psychotic symptoms, and suicidal ideation with plan to cut himself with 

boxcutter.  Patient requires continued inpatient care because of current 

psychosis and suicidal ideation, and requires inpatient level of care to 

stabilize in order to no longer be a danger to himself and gravely disabled due 

to mental illness.  Patient could benefit from continued inpatient 

hospitalization for crisis stabilization, safety, and medication evaluation.    





Plan:





(1) Psychotropic medications: After reviewing options, risks, and benefits 

patient agrees to continue current medications with following changes:  Taper 

Risperidone to 1 mg po QD, and begin trial of Seroquel 100 mg po BID.  No other 

medication changes at this time as more time is needed to determine ongoing 

tolerability and efficacy.  Plan is to continue to observe patient for response 

and side effects from medications, and ongoing monitoring and evaluation.  


(2) Review with patient informed consent and recommendations for psychotropic 

medication treatment listed below


(3) Labs: no additional labs at this time


(4) Therapy: continue milieu and group therapy  


(5) Further investigation including gathering information from patients 

relatives and review of past case records to inform treatment plan.


(6) Safety/Wellness plan and follow-up outpatient appointments to be 

established prior to discharge.  Next steps are for patient to meet with care 

manager to plan a safe discharge plan and establish outpatient services for 

ongoing treatment.  


(7) Confer with inpatient treatment team regarding treatment plan.  


(8) Legal status: M1 hold to sign-in voluntarily


(9) Consider discharge on Monday if patient is in stable condition, safe, and 

has a safe discharge plan.   


(10) Substance abuse interventions: methamphetamine





PSYCHOTROPIC MEDICATION TREATMENT INFORMED CONSENT and RECOMMENDATIONS: Review 

nature of condition, diagnosis, and prognosis.  Review nature and purpose of 

psychotropic medication treatment.  Review type of psychotropic medications 

being ordered.  Review risk and benefits of psychotropic medication treatment.  

Review probable length of time patient will need to take medications.  Review 

risk and benefits of not undergoing psychotropic medication treatment.  Review 

alternative treatments to psychotropic medications.  Review psychotropic 

medications contraindications, drug-drug interactions, side effects, and 

importance of reporting any side effects to a psychiatric provider or nurse 

during inpatient hospitalization, and upon discharge to patients psychiatric 

outpatient provider, primary care provider, or other health care professional.  

Review importance of asking a nurse, psychiatric provider, or primary care 

provider any questions or problems concerning the psychotropic medications.  

Verify patient understands the information that has been provided, and 

understands, accepts, and agrees to psychotropic medications.  





Review patients safety plan and importance of patient to report to staff while 

hospitalized if patient is ever a danger to self/others, or unable to care for 

self, and upon discharge, the importance for patient to contact [facility] or [facility], or go to the nearest emergency room, if patient is ever a 

danger to self/others, or unable to care for self.  Recommend that upon 

discharge patient establish medication management treatment with a psychiatric 

provider, establishes routine therapy appointments, and follow-up with primary 

care provider.  Verify patient understands and agrees to these recommendations.





10/12/18 09:01





Subjective: 


Following up with patient for evaluation of psychosis, markel, and safety.  

Patient screams, "I am so tired of feeling like this, I want to just go get a 

boxcutter and slash myself and get this over with.  These medications are not 

working, I continue to hear things, I cannot sleep, cant function like this.  

Seroquel has worked better than Risperdal for me in the past."  Patient 

expresses the following psychiatric symptoms severe depression, suicidal 

ideation with plan to cut with boxcutter, and auditory hallucinations.  Patient 

reports taking medications as prescribed, and describes response to medications 

as poor.  Patient does not report undesirable side effects from the medications

, and agrees to taper and discontinue Risperdal and begin trial of Seroquel 100 

mg po BID.  Patient reports he has taken Seroquel 600 mg po QHS in the past.  

Patient reports appetite as good, and reports eating all meals.  Patient 

describes getting 4 hours of sleep, and reports he had difficulty sleeping due 

to auditory hallucinations.  





Objective: 





 Vital Signs











Temp Pulse Resp BP Pulse Ox


 


 36.7 C   85   16   97/59 L  93 


 


 10/12/18 06:00  10/12/18 06:00  10/12/18 06:00  10/12/18 06:00  10/12/18 06:00








NURSING REPORT: Consulted with nursing for update on patients progress in 

treatment.  Nurses report patient is not engaged in treatment, is not attending 

groups, withdrawn to his room, slept 8 hours, expresses the following 

psychiatric symptoms: severe depression, auditory hallucinations; exhibits the 

following psychiatric symptoms: withdrawn, irritable, agitated; is eating all 

meals, is agreeable to medications and taking as prescribed with no report of 

side effects, with no s/s of EPS/akathisia, and denies HI, denies A/V 

hallucinations, and denies delusions.  Patient reports SI with plan to cut 

himself with a boxcutter.  





CASE COORDINATOR UPDATE: currently establishing setting up outpatient services 

for medication management, therapy, and substance abuse treatment.





MSE: The patient is a well-nourished male looking stated chronological age.  

Attire is appropriate and dress is casual and is neat.  Grooming status is 

appropriate and neat.  Ambulation is independent.  Gait is normal and 

coordinated.  Posture is abnormal and tense.  Eye contact is inappropriate, and 

staring.  Motor activity is appropriate with purposeful, organized, coordinated 

movements; with no involuntary movements.  Attitude is uncooperative and angry.

  Patient appears distractible and does not relate well to this interviewer.  

Language production is spontaneous.  Rate is pressured and volume is loud (

screaming).  Articulation is clear.  Patient reports mood as irritable with 

congruent and expansive affect.  Patients thought process is disorganized, non-

linear, illogical, with loose associations, tangential thought.  Patient does 

not report suicidal/homicidal thoughts, ideas, or plans.  Patient reports 

auditory hallucinations, denies visual hallucinations.  Patient denies 

delusions.  Patient does not appear to be attending to internal stimuli.  

Patients attention and concentration are poor.  Patient is oriented to person, 

place, time.  Patients insight is poor.  Patients judgment is poor.  No 

evidence of gross cognitive dysfunction at any point during the interview.  No 

evidence of apparent dysfunction in recent or remote memory.





SUBSTANCE ABUSE BRIEF INTERVENTION: Brief intervention regarding the risks of 

methamphetamine abuse is provided to patient with goal to reduce the risk of 

harm that could result from the continued use of methamphetamine, with the 

general aim to investigate the problem, raise awareness of problem, develop a 

solution with the patient, recommend a specific change or activity, and 

motivate the patient toward change.  Assess substance abuse behavior and give 

supportive advice about harm reduction, recommend a reduction in hazardous/at-

risk consumption patterns, and facilitate referrals for additional specialized 

treatment with care coordinator.  Intermediate goal is for the patient to quit 

methamphetamine and engage in substance abuse treatment.  Intervention focus on 

intermediate goals to allow for more immediate success in the treatment process 

to keep the patient motivated.  Review following with patient: Methamphetamine 

use risks: Short-term: insomnia, irritability, aggressive behavior, 

hallucinations, delusions, intellectual deficits, anxiety, depression, 

convulsions, damage to blood vessels in the brain causing strokes, high fevers, 

collapse of the circulatory system. Long-term: damage to nerve pathways, maybe 

irreversibly; overstimulation to dopamine impairing dopamine transport and 

reducing efficiency of dopamine receptors, the reward system becomes worn out, 

leading to inability to experience pleasure for years.  











- Time Spent With Patient


Time Spent With Patient: 


30 minutes, met with patient individually.








- Pending Discharge


Pending Discharge Within 24 Hours: No


Pending Discharge Within 48 Hours: No





ICD10 Worksheet


Patient Problems: 


 Problems











Problem Status Onset


 


Suicidal ideation Acute  


 


Schizoaffective disorder, bipolar type Chronic  


 


Cannabis use disorder, severe, dependence Acute  


 


Homelessness Acute  


 


Neck malignant neoplasm Acute  


 


Stimulant use disorder Acute

## 2018-10-12 NOTE — ASMTBHDC
Notes

 

Note:                         

Notes:

[employer] supervior of discharge planning Sim, [phone], requested to be contacted in relation to 

pt's discharge planning. 

 

Date Signed:  10/12/2018 01:45 PM

Electronically Signed By:Orquidea Bear

## 2018-10-12 NOTE — ASMTCMCOM
CM Note

 

CM Note                       

Notes:

Pt. reports feeling "alright, just got a headache". Pt. reports sleeping "okay". Pt. stated he is 

eating well. Pt. stated "seems like they've been issues with doctor making changes". Pt. stated he 

wants to go to [place], stating "used to live there before". Pt. stated he would go to [place] or 

[place]. Pt. stated he is currently having suicidal thoughts, rating them a 10/10, adding he has a 

plan but didn't want ot discuss it. Pt. did contract for safety. 



Pt. presents as alert, with his head in his hands, guarded, passive and a bit delayed answering 

questions. Staff report pt. sleeping 12 hours and refusing his Depakote. 

 

Date Signed:  10/12/2018 01:21 PM

Electronically Signed By:Orquidea Bear

## 2018-10-13 RX ADMIN — DIVALPROEX SODIUM SCH MG: 500 TABLET, EXTENDED RELEASE ORAL at 20:53

## 2018-10-13 RX ADMIN — NICOTINE POLACRILEX PRN MG: 2 GUM, CHEWING BUCCAL at 15:55

## 2018-10-13 RX ADMIN — NICOTINE POLACRILEX PRN MG: 2 GUM, CHEWING BUCCAL at 20:53

## 2018-10-13 NOTE — SOAPPROG
SOAP Progress Note


Assessment/Plan: 


Assessment:





Per Jama Martinez's note:


Assessment:





Schizoaffective disorder, currently depressed and psychotic.  Stimulant use 

disorder, severe.  Stimulant withdrawal.  No improvement noted. (see subjective/

objective note).  Patient is not safe to discharge at this time as patient 

continues to exhibit signs of acute psychosis, and express depression, 

psychotic symptoms, and suicidal ideation with plan to cut himself with 

boxcutter.  Patient requires continued inpatient care because of current 

psychosis and suicidal ideation, and requires inpatient level of care to 

stabilize in order to no longer be a danger to himself and gravely disabled due 

to mental illness.  Patient could benefit from continued inpatient 

hospitalization for crisis stabilization, safety, and medication evaluation.    




















Plan:


10/13/18 17:50


1. Patient states he still wants to go to [place], even though he has no providers 

there and no social support. MD encourages patient to get connected with 

services here in [place] and postpone any decisions about relocating until he is 

more stable. 


2. Denies any psychotic sxs. 


3. CC checking about f/u appt at [facility] for neck growth. 





Subjective: 


Patient slept 8.5 hrs last night, ate 100% of meals today. He isolates in room 

most of the day, declines to attend groups. He claims to feel sad b/c he doesn'

t have the money to travel to [place], but denies any intent or plan to hurt 

himself. 





Objective: 





 Vital Signs











Temp Pulse Resp BP Pulse Ox


 


 36.5 C   84   16   119/76   96 


 


 10/13/18 06:00  10/13/18 06:00  10/13/18 06:00  10/13/18 06:00  10/13/18 06:00








MSE: Affect: Sad  Mood: "Depressed"  TP: Linear  TC: Denies any intent or plan 

to hurt himself  Insight/Judgment: Poor





- Time Spent With Patient


Time Spent With Patient: 





15"





- Pending Discharge


Pending Discharge Within 24 Hours: No


Pending Discharge Within 48 Hours: Yes


Pending Discharge Date: 10/15/18 (Likely to d/c on Monday once aftercare in 

place)


Pending Discharge Time: 11:00





ICD10 Worksheet


Patient Problems: 


 Problems











Problem Status Onset


 


Suicidal ideation Acute  


 


Schizoaffective disorder, bipolar type Chronic  


 


Cannabis use disorder, severe, dependence Acute  


 


Homelessness Acute  


 


Neck malignant neoplasm Acute  


 


Stimulant use disorder Acute

## 2018-10-14 RX ADMIN — DIVALPROEX SODIUM SCH MG: 500 TABLET, EXTENDED RELEASE ORAL at 21:04

## 2018-10-14 RX ADMIN — NICOTINE POLACRILEX PRN MG: 2 GUM, CHEWING BUCCAL at 10:43

## 2018-10-14 RX ADMIN — NICOTINE POLACRILEX PRN MG: 2 GUM, CHEWING BUCCAL at 12:34

## 2018-10-14 RX ADMIN — NICOTINE POLACRILEX PRN MG: 2 GUM, CHEWING BUCCAL at 08:19

## 2018-10-14 NOTE — SOAPPROG
SOAP Progress Note


Assessment/Plan: 


Assessment:





Per Jama Martinez's note:


Assessment:





Schizoaffective disorder, currently depressed and psychotic.  Stimulant use 

disorder, severe.  Stimulant withdrawal.  No improvement noted. (see subjective/

objective note).  Patient is not safe to discharge at this time as patient 

continues to exhibit signs of acute psychosis, and express depression, 

psychotic symptoms, and suicidal ideation with plan to cut himself with 

boxcutter.  Patient requires continued inpatient care because of current 

psychosis and suicidal ideation, and requires inpatient level of care to 

stabilize in order to no longer be a danger to himself and gravely disabled due 

to mental illness.  Patient could benefit from continued inpatient 

hospitalization for crisis stabilization, safety, and medication evaluation.    




















Plan:


10/13/18 17:50


1. Patient states he still wants to go to [place], even though he has no providers 

there and no social support. MD encourages patient to get connected with 

services here in [place] and postpone any decisions about relocating until he is 

more stable. 


2. Denies any psychotic sxs. 


3. CC checking about f/u appt at [facility] for neck growth. 





10/14/18 19:04


1. Patient refused VPA on 10/11/18, but has taken it the past 2 nights. 

Unlikely his VPA is at therapeutic levels yet. Will need to have level checked 

by outpatient provider after he leaves, so dose can be adjusted if necessary.


2. Denies any psychotic sxs. No change to Seroquel.


3. Patient still has SI, but no plan or intent to act on thoughts.


4. Needs f/u appts at [facility] and at [facility]. 


Subjective: 


Patient isolates in his room and sleeps most of the day. He has not attended 

any groups all weekend. He denies any AH/VH. He reports he still thinks about 

dying, but denies any plan or intent to act on these thoughts. 





Objective: 





 Vital Signs











Temp Pulse Resp BP Pulse Ox


 


 36.7 C   82   16   102/68   94 


 


 10/14/18 06:00  10/14/18 06:00  10/14/18 06:00  10/14/18 06:00  10/14/18 06:00








MSE: Affect: Flat  Mood: "OK"  TP: Linear  TC: Still has SI, but denies plan or 

intent  Insight/Judgment: Improving





- Time Spent With Patient


Time Spent With Patient: 


15"








- Pending Discharge


Pending Discharge Within 24 Hours: Yes


Pending Discharge Within 48 Hours: No


Pending Discharge Date: 10/15/18 (Possible d/c Mon or Tue once f/u appts 

confirmed)


Pending Discharge Time: 11:00





ICD10 Worksheet


Patient Problems: 


 Problems











Problem Status Onset


 


Suicidal ideation Acute  


 


Schizoaffective disorder, bipolar type Chronic  


 


Cannabis use disorder, severe, dependence Acute  


 


Homelessness Acute  


 


Neck malignant neoplasm Acute  


 


Stimulant use disorder Acute

## 2018-10-15 VITALS — SYSTOLIC BLOOD PRESSURE: 93 MMHG | DIASTOLIC BLOOD PRESSURE: 61 MMHG

## 2018-10-15 RX ADMIN — NICOTINE POLACRILEX PRN MG: 2 GUM, CHEWING BUCCAL at 03:12

## 2018-10-15 RX ADMIN — NICOTINE POLACRILEX PRN MG: 2 GUM, CHEWING BUCCAL at 08:48

## 2018-10-15 RX ADMIN — NICOTINE POLACRILEX PRN MG: 2 GUM, CHEWING BUCCAL at 14:43

## 2018-10-15 NOTE — BDS
REASON FOR ADMISSION:  From the ED note dated 10/10/2018, the patient is 
homeless with a history of schizoaffective disorder, history of methamphetamine 
use.  Three days ago, the patient took a bus to the ER complaining of worsening 
depression with plan to lacerate wrist.  The patient denied attempts.  Denied 
hallucinations.  Denied alcohol use.  The patient denied self-injurious 
behavior.  The patient denied complaints of physical pain in the ER.  The 
patient was admitted involuntarily on an M1 hold due to being a danger to 
himself.  The patient was admitted for safety, crisis stabilization, and 
medication management.



ADMITTING DIAGNOSIS:  

1.  Schizoaffective disorder, bipolar type.

2.  Stimulant use disorder.

3.  Cannabis use disorder, severe.

4.  Malingering.

5.  Homelessness.



ADMISSION PHYSICAL EXAM:  The patient was seen on 10/10/2018, for a hospitalist 
H and P consult for medical clearance for inpatient Behavioral Health stay.  
The patient was medically cleared for inpatient psychiatric hospitalization and 
treatment.  For further details, please refer to hospitalist's H and P consult 
dated 10/10/2018.



ADMISSION LABS:  CBC from 10/10/2018, within normal limits except eosinophils 
were low at 0.0.  Basophils were low at 0.2, and absolute eosinophils were low 
at 0.00.  BMP from 10/10/2018, within normal limits except BUN was elevated at 
25.  Glucose is elevated at 104.  Toxicology screen from 10/10/2018, was 
negative for substances tested and was negative for ethyl alcohol.  Valproic 
acid level from 10/11/2018, was 55.3.



MAJOR PROCEDURES OR TESTS:  None.



HOSPITAL COURSE:  The most prominent symptoms and behaviors while the patient 
was here were withdrawn.  The patient remained the majority of the time in his 
room sleeping.  The patient did come out for meals.  The patient had a flat 
affect.  The patient did sleep the majority of his hospitalization.  This is 
likely due to recent methamphetamine use and patient experiencing 
methamphetamine withdrawal symptoms.  The patient did report severe depression 
and suicidal ideation at time of admission.  The patient did improve and was no 
longer reporting suicidal ideation and did report that his depression had 
improved since time of admission.  Treatment modalities utilized throughout the 
patient's hospitalization were milieu and group therapy.  Depakote ER 1500 mg 
p.o. q.h.s. was continued to target mood symptoms, was tolerated with no report 
of side effects and with good response.  The patient reported poor response 
from Risperdal.  Risperdal 1 mg p.o. b.i.d. was started at time of admission.  
The patient reported poor response to this medication and requested the 
medication be switched to Seroquel.  The patient reported that he has had good 
response from Seroquel in the past.  Seroquel 100 mg p.o. b.i.d. was started to 
target mood and psychosis symptoms; it was tolerated with no report of side 
effects and with good response.  At time of discharge, the patient agreed to 
dose Seroquel all at bedtime with dose of 200 mg p.o. q.h.s.  The patient has 
improved considerably with no signs of psychiatric symptoms and no psychiatric 
symptoms expressed at discharge.  The patient reports he has improved since 
admission, states to be in stable condition, feels safe to discharge, and he 
contracts for safety.  Patient's response to treatment was good.  There were no 
adverse or unexpected results of treatment.  The patient was safe throughout 
his stay and was appropriate with staff and other patients.  The patient did 
remain withdrawn to his room for the majority of his stay and only came out to 
eat meals.  The patient met with the treatment team prior to discharge, to 
assess readiness to discharge and review discharge plan.  The treatment team 
consensus is the patient is in stable condition, has a safe discharge plan, and 
is ready to discharge today.



CONDITION AT DISCHARGE:  Patient is in stable condition and is no longer a 
danger to self or others, and is not gravely disabled due to mental illness.  
Patient is no longer in need of inpatient level of care, and can be safely and 
effectively treated within the community.  The patients level of risk at time 
of discharge is low.  MSE: The patient is casually dressed and with good hygiene
, and looks stated age.  Patient is sitting, posture is upright, and position 
is relaxed.  Patient appears awake, alert, and responds appropriately and 
reasonably during interview.  Patient is engaged, relates well to interviewer, 
and emotional facial expression is appropriate to situation and changes 
appropriately with topic.  Patient is cooperative, makes comfortable eye contact
, and movements are voluntary, deliberate, coordinated, and smooth and even 
with no inappropriate movements.  Patient makes laryngeal sounds effortlessly 
and shares conversation appropriately; pace of conversation is appropriate, and 
stream of talking is fluent; articulation is clear and understandable; word 
choice is effortless and appropriate for education level; completes sentences, 
occasionally pausing to think; rate and volume are appropriate for interview 
and setting.  Patient reports mood as euthymic.  Patients affect is stable with 
full variable range, congruent with mood, and appropriate to speech and 
circumstances.  Patient has linear and logical thinking, with no loose 
associations, tangential thought, thought blocking, concrete thinking, or any 
other signs of formal thought disorder.  Patient denies suicidal and homicidal 
ideation, and denies hallucinations and delusions.  Patient appears to be a 
reliable historian with sound judgement and good insight into current 
condition.  Patient has no apparent dysfunction in recent or remote memory noted
, and no evidence of gross cognitive dysfunction noted at any point during the 
interview.



DISCHARGE DIAGNOSES:  

1.  Schizoaffective disorder, bipolar type.

2.  Stimulant use disorder.

3.  Cannabis use disorder, severe.

4.  Malingering.

5.  Homelessness.



CURRENT MEDICATIONS:  After reviewing options, risks, and benefits, the patient 
agrees to continue Seroquel 200 mg p.o. q.h.s. and Depakote ER 1500 mg p.o. 
q.h.s.  The patient requests prescriptions for these medications at time of 
discharge, and patient requests Depakote to be dosed in 250 mg tablets, as this 
is easy for him to swallow.  Prescriptions for these medications are provided 
for 30 days.  The prescriptions are reviewed with the patient at time of 
discharge to ensure accuracy and patient understanding.



DISPOSITION:  The patient left hospital independently and voluntarily with 
plans to go to the [place] homeless shelter and also has plans to follow up at 
[facility] at the [facility].



FOLLOWUP:  Care coordinator reports the appropriate outpatient follow-up 
services have been established and outpatient appointments have been scheduled.
  The patient received written instructions with times and dates of outpatient 
follow-up appointments.  The following follow-up recommendations were provided 
to the patient at discharge: Continue psychotropic medications as prescribed 
and attend appointments as scheduled.  Report any side effects to a psychiatric 
outpatient provider, a primary care provider, or other health care 
professional.  Address any questions or problems concerning the psychotropic 
medications with a psychiatric outpatient provider, a primary care provider, or 
other health care professional.  Contact [facility] or [facility], or go 
to the nearest emergency room, if you are ever a danger to yourself/others, or 
unable to care for yourself.  As soon as possible, establish a routine 
medication management treatment with a psychiatric provider, establish routine 
therapy appointments, and follow-up with a primary care provider.  



SUBSTANCE ABUSE BRIEF INTERVENTION: Brief intervention regarding the risks of 
cannabis and methamphetamine abuse is provided to patient with goal to reduce 
the risk of harm that could result from the continued use of cannabis and 
methamphetamine, with the general aim to investigate the problem, raise 
awareness of problem, develop a solution with the patient, recommend a specific 
change or activity, and motivate the patient toward change.  Assess substance 
abuse behavior and give supportive advice about harm reduction, recommend a 
reduction in hazardous/at-risk consumption patterns, and facilitate referrals 
for additional specialized treatment with care coordinator.  Intermediate goal 
is for the patient to quit and attend outpatient substance abuse treatment.  
Intervention focus on intermediate goals to allow for more immediate success in 
the treatment process to keep the patient motivated.  Review following with 
patient: Cannabis use risks: Short-term use: impaired short-term memory, 
impaired motor coordination, altered judgement, in high doses paranoia and 
psychosis.  Long-term use addiction, diminished life satisfaction and 
achievement, symptoms of chronic bronchitis, and increased risk of chronic 
psychosis disorders if predisposition to such disorders.  In withdrawal anger, 
aggression irritability, anxiety and nervousness, decreased appetite or weight 
loss, restlessness, and sleep difficulties with strange dreams.  
Methamphetamine use risks: Short-term: insomnia, irritability, aggressive 
behavior, hallucinations, delusions, intellectual deficits, anxiety, depression
, convulsions, damage to blood vessels in the brain causing strokes, high fevers
, collapse of the circulatory system. Long-term: damage to nerve pathways, 
maybe irreversibly; overstimulation to dopamine impairing dopamine transport 
and reducing efficiency of dopamine receptors, the reward system becomes worn 
out, leading to inability to experience pleasure for years.  



OUTPATIENT SUBSTANCE ABUSE TREATMENT: Patient referred to outpatient provider 
and treatment for continued treatment related to substance abuse.



LEGAL COURSE:  The patient was admitted on an M1 hold for involuntary inpatient 
psychiatric hospitalization.  The patient became voluntary during his stay.  
The patient discharged today independently and voluntarily.



ATTITUDE AT TIME OF DISCHARGE:  The patients attitude was positive at time of 
discharge, and patient reports looking forward to discharging today.  The 
patient reports he feels safe to discharge, is no longer a danger to himself or 
others, is in stable condition, and contracts for safety.  Patient states he 
will continue medications as prescribed, and establish medication management 
treatment with an outpatient provider after discharge.  Patient reports he 
understands the information that has been provided to him, and he understands, 
accepts, and agrees to psychotropic medications.  Patient describes internal 
protective factors as the coping skills he has learned while hospitalized here, 
and he plans to continue to practice these coping skills after discharge.  



LABS AND RADIOLOGY STUDIES:  There were no pending labs or studies at time of 
discharge.



ADVANCE DIRECTIVES:  There were no advance directives on file, and patient was 
full code during this hospitalization.



The following psychotropic medication treatment informed consent and 
recommendations were provided to the patient at time of discharge.  Patient 
reports he understands, accepts, and agrees to the information that has been 
provided.   



PSYCHOTROPIC MEDICATION TREATMENT INFORMED CONSENT and RECOMMENDATIONS: Review 
nature of condition, diagnosis, and prognosis.  Review nature and purpose of 
psychotropic medication treatment. Review type of psychotropic medications 
being prescribed.  Review risk and benefits of psychotropic medication 
treatment.  Review probable length of time will need to take medications.  
Review risk and benefits of not undergoing psychotropic medication treatment.  
Review alternative treatments to psychotropic medications.  Review psychotropic 
medications contraindications, side effects, and importance of reporting any 
side effects to a psychiatric provider, primary care provider, or other health 
care professional.  Review importance of him asking a psychiatric provider or 
primary care provider any questions or problems concerning the psychotropic 
medications.  



Review safety plan and the importance to contact [facility] or [facility]
, or go to the nearest emergency room, if ever a danger to yourself/others, or 
unable to care for yourself.  Recommend upon discharge to establish routine 
medication management treatment with a psychiatric provider, establish routine 
therapy appointments, and follow-up with a primary care provider.  Verify 
patient understands, accepts, and agrees to the information that has been 
provided. 



Job #:  [id number]

MTDD

## 2018-10-15 NOTE — ASMTBHDC
Notes

 

Note:                         

Notes:

Pt. is scheduled to discharge today. Pt. stated he can get himself to his follow up 

appointments. CC provided pt with a bus pass and reserved a bed at the [facility] for 


pt. 



Pt's follow up appointments are:



[facility]



[facility]



[address] [place] [address]



Phone: [phone]







**Next appointment: Tuesday October 16th 2018 (10/16/18) at 1:00pm, please check in at 12:45pm for 

Walter Melendez (telehealth provider)**



**Next Care Coordination appointment: Tuesday October 23rd 2018 (10/23/18) at 10:30am, please check 


in at 10:15am for Mckenna Franco**







[facility] 9[facility]30 [facility].m.  and all 

weekdays but on Thursdays from 12:30-2 pm.







Dr. Wendy DAS



[facility], [address] [place] [id number]



Ph: [phone]



**Next appointment: Monday October 22nd 2018 (10/22/18) @ 4:00pm (Bring photo ID and insurance 

card)

 

Date Signed:  10/15/2018 02:28 PM

Electronically Signed By:Orquidea Bear